# Patient Record
Sex: FEMALE | Race: WHITE | Employment: OTHER | URBAN - NONMETROPOLITAN AREA
[De-identification: names, ages, dates, MRNs, and addresses within clinical notes are randomized per-mention and may not be internally consistent; named-entity substitution may affect disease eponyms.]

---

## 2017-03-09 ENCOUNTER — OFFICE VISIT (OUTPATIENT)
Dept: FAMILY MEDICINE | Facility: OTHER | Age: 67
End: 2017-03-09
Attending: PHYSICIAN ASSISTANT
Payer: MEDICARE

## 2017-03-09 VITALS
SYSTOLIC BLOOD PRESSURE: 124 MMHG | DIASTOLIC BLOOD PRESSURE: 80 MMHG | HEART RATE: 85 BPM | WEIGHT: 175 LBS | BODY MASS INDEX: 27.47 KG/M2 | TEMPERATURE: 98.4 F | RESPIRATION RATE: 16 BRPM | OXYGEN SATURATION: 97 % | HEIGHT: 67 IN

## 2017-03-09 DIAGNOSIS — R39.9 UTI SYMPTOMS: Primary | ICD-10-CM

## 2017-03-09 DIAGNOSIS — L98.9 SKIN LESION: ICD-10-CM

## 2017-03-09 DIAGNOSIS — Z72.0 TOBACCO ABUSE: ICD-10-CM

## 2017-03-09 DIAGNOSIS — Z86.19 HISTORY OF GENITAL WARTS: ICD-10-CM

## 2017-03-09 LAB
ALBUMIN UR-MCNC: NEGATIVE MG/DL
APPEARANCE UR: CLEAR
BACTERIA #/AREA URNS HPF: ABNORMAL /HPF
BILIRUB UR QL STRIP: ABNORMAL
COLOR UR AUTO: YELLOW
GLUCOSE UR STRIP-MCNC: NEGATIVE MG/DL
HGB UR QL STRIP: ABNORMAL
KETONES UR STRIP-MCNC: ABNORMAL MG/DL
LEUKOCYTE ESTERASE UR QL STRIP: ABNORMAL
NITRATE UR QL: NEGATIVE
NON-SQ EPI CELLS #/AREA URNS LPF: ABNORMAL /LPF
PH UR STRIP: 5.5 PH (ref 5–7)
RBC #/AREA URNS AUTO: ABNORMAL /HPF (ref 0–2)
SP GR UR STRIP: 1.02 (ref 1–1.03)
URN SPEC COLLECT METH UR: ABNORMAL
UROBILINOGEN UR STRIP-ACNC: 0.2 EU/DL (ref 0.2–1)
WBC #/AREA URNS AUTO: ABNORMAL /HPF (ref 0–2)

## 2017-03-09 PROCEDURE — 81001 URINALYSIS AUTO W/SCOPE: CPT | Performed by: PHYSICIAN ASSISTANT

## 2017-03-09 PROCEDURE — 99213 OFFICE O/P EST LOW 20 MIN: CPT | Performed by: PHYSICIAN ASSISTANT

## 2017-03-09 PROCEDURE — 99212 OFFICE O/P EST SF 10 MIN: CPT

## 2017-03-09 RX ORDER — SULFAMETHOXAZOLE/TRIMETHOPRIM 800-160 MG
1 TABLET ORAL 2 TIMES DAILY
Qty: 14 TABLET | Refills: 0 | Status: SHIPPED | OUTPATIENT
Start: 2017-03-09 | End: 2017-03-17

## 2017-03-09 ASSESSMENT — PAIN SCALES - GENERAL: PAINLEVEL: NO PAIN (1)

## 2017-03-09 NOTE — PROGRESS NOTES
Chief complaint:   Chief Complaint   Patient presents with     Urinary Problem     urinary urgency, dysuria and pelvic pressure for the past few months off and on, but it has been getting worse       Subjective: Ivonne Kirkland is a 66 year old female with a 3 month history of intermittent dysuria, urinary frequency and urgency. Denies flank pain, nausea, vomiting, fever. Next questions about genital skin lesions I diagnosed as warts in 2015. She has a several year history of lesions that come and go. She uses Aldara which seems to help but leaves a burning sensation on skin.     PMH, PSH, FH reviewed and unchanged    Current Outpatient Prescriptions   Medication Sig Dispense Refill     ASPIRIN PO Take 81 mg by mouth daily.       imiquimod (ALDARA) 5 % cream Apply a small amount to affected areas. Leave on for 8 hours, then wash off. May need to use up to 8 weeks (Patient not taking: Reported on 3/9/2017) 12 packet 3       Allergies   Allergen Reactions     Cefuroxime Other (See Comments) and Nausea     dizziness     Clindamycin Hcl Diarrhea     Codeine Sulfate Other (See Comments)     Passed out     Morphine Sulfate Nausea and Vomiting         ROS:  GI/: as above  Other pertinent systems reviewed and negative    Objective:   B/P: 124/80, T: 98.4, P: 85, R: 16    Gen:Appears well, in no apparent distress.   Resp: Lungs CTA without wheeze, rales, rhonchi  Card: RRR  Abd:Round, soft. Normal bowel sounds. NTTP. No mass or organomegaly. No CVA TTP.        Assessment:   (R39.9) UTI symptoms  (primary encounter diagnosis)  Plan: *UA reflex to Microscopic and Culture, Urine         Microscopic, sulfamethoxazole-trimethoprim         (BACTRIM DS/SEPTRA DS) 800-160 MG per tablet            (Z72.0) Tobacco abuse  Plan: CALL IT QUITS (QUITPLAN) REFERRAL            (L98.9) Skin lesion  Comment: Will refer for opinion by gynecology. I thought lesions were genital warts but possibly another type of skin lesion  Plan: OB/GYN  REFERRAL            (Z86.19) History of genital warts  Comment: as above      Plan: Prescription per Epic - also push fluids, recommend cranberry juice. Call or return to clinic prn if these symptoms worsen or fail to improve as anticipated.        Sue Martínez PA-C

## 2017-03-09 NOTE — MR AVS SNAPSHOT
After Visit Summary   3/9/2017    Ivonne Kirkland    MRN: [id number]           Patient Information     Date Of Birth          1950        Visit Information        Provider Department      3/9/2017 1:45 PM Sue Martínez PA [facility]        Today's Diagnoses     UTI symptoms    -  1    Tobacco abuse        Skin lesion        History of genital warts          Care Instructions    Follow up if symptoms persist or worsen.          Follow-ups after your visit        Additional Services     CALL IT QUITS (QUITPLAN) REFERRAL       [place] [employer] QUITLINES FAX FORM  Fax form to: [phone]    The clinic will facilitate the referral to the quitline.    Provider Information:  ===============================================================  KALEB Davenport  ID#: [id number] - [facility] [phone] Fax: [phone]   [email or web address]  Payor: MEDICARE / Plan: MEDICARE / Product Type: Medicare /   ===============================================================    The Public Health Service Guideline does not recommend providing over-the-counter nicotine replacement therapy products without physician authorization to patients with the following conditions: pregnancy, uncontrolled high blood pressure, or cardiovascular diseases.     I authorize the [place] Tobacco Quitlines to provide over-the-counter nicotine replacement products for the patient listed below if the patient's health plan benefits cover NRT or if the patient is eligible for QUITPLAN services.    Patient Consented to:  ===============================================================  - YES - I am ready to quit tobacco and request the above information be given to the quitline so they may contact me.  I understand that one of [facility] Tobacco Quitlines will inform my provider about my  participation.  ===============================================================  Please check the BEST 3-hour call window for them to reach you: 11am - 2pm  May we leave a message?  YES  Language Preference:  English  Phone Number: Home Phone      [phone]  Mobile          Not on file.     E-mail Address: No e-mail address on record    ========================================================================  FOR QUITLINE USE ONLY:  THIS INFORMATION WILL BE PROVIDED BACK TO THE PROVIDER  Contact date: __/ __/__ or ____ Did not reach after three attempts.    Outcome:  __ Enrolled in telephone counseling program  __ Declined  __ Not Reached    Stage of readiness: _______________________  Planned Quit Date: ___/ ___/ ___  Comments:      2011 [facility]   This message funded by [employer] [facility], an independent licensee of the [employer]. Rev. 11/1/12            OB/GYN REFERRAL       Your provider has referred you to: Tamia Garcia    Please be aware that coverage of these services is subject to the terms and limitations of your health insurance plan.  Call member services at your health plan with any benefit or coverage questions.      Please bring the following to your appointment:  >>   Any x-rays, CTs or MRIs which have been performed.  Contact the facility where they were done to arrange for  prior to your scheduled appointment.  Any new CT, MRI or other procedures ordered by your specialist must be performed at a [place] facility or coordinated by your clinic's referral office.    >>   List of current medications   >>   This referral request   >>   Any documents/labs given to you for this referral                  Who to contact     If you have questions or need follow up information about today's clinic visit or your schedule please contact [facility] directly at [phone].  Normal or non-critical lab and imaging results  "will be communicated to you by MyChart, letter or phone within 4 business days after the clinic has received the results. If you do not hear from us within 7 days, please contact the clinic through Donde or phone. If you have a critical or abnormal lab result, we will notify you by phone as soon as possible.  Submit refill requests through Donde or call your pharmacy and they will forward the refill request to us. Please allow 3 business days for your refill to be completed.          Additional Information About Your Visit        Donde Information     Donde lets you send messages to your doctor, view your test results, renew your prescriptions, schedule appointments and more. To sign up, go to [email or web address] . Click on \"Log in\" on the left side of the screen, which will take you to the Welcome page. Then click on \"Sign up Now\" on the right side of the page.     You will be asked to enter the access code listed below, as well as some personal information. Please follow the directions to create your username and password.     Your access code is: [email or web address]  Expires: 2017  2:50 PM     Your access code will  in 90 days. If you need help or a new code, please call your [facility] clinic or [phone].        Care EveryWhere ID     This is your Care EveryWhere ID. This could be used by other organizations to access your [facility] medical records  [id number]        Your Vitals Were     Pulse Temperature Respirations Height Pulse Oximetry BMI (Body Mass Index)    85 98.4  F (36.9  C) (Tympanic) 16 5' 6.5\" (1.689 m) 97% 27.82 kg/m2       Blood Pressure from Last 3 Encounters:   17 124/80   10/24/15 157/93   08/04/15 164/80    Weight from Last 3 Encounters:   17 175 lb (79.4 kg)   08/04/15 172 lb (78 kg)   14 170 lb (77.1 kg)              We Performed the Following     *UA reflex to Microscopic and Culture     CALL IT QUITS (QUITPLAN) REFERRAL     OB/GYN REFERRAL     Urine " Microscopic          Today's Medication Changes          These changes are accurate as of: 3/9/17  2:50 PM.  If you have any questions, ask your nurse or doctor.               Start taking these medicines.        Dose/Directions    sulfamethoxazole-trimethoprim 800-160 MG per tablet   Commonly known as:  BACTRIM DS/SEPTRA DS   Used for:  UTI symptoms   Started by:  Sue Martínez PA        Dose:  1 tablet   Take 1 tablet by mouth 2 times daily   Quantity:  14 tablet   Refills:  0            Where to get your medicines      These medications were sent to [id number] - [place], [place] - [address]   [id number] [address] [facility] [place] [place] [address]     Phone:  [phone]     sulfamethoxazole-trimethoprim 800-160 MG per tablet                Primary Care Provider Office Phone # Fax #    Emanuel MAYS DO [place] [phone]       [facility] [address]  [facility] [address]        Thank you!     Thank you for choosing [facility]  for your care. Our goal is always to provide you with excellent care. Hearing back from our patients is one way we can continue to improve our services. Please take a few minutes to complete the written survey that you may receive in the mail after your visit with us. Thank you!             Your Updated Medication List - Protect others around you: Learn how to safely use, store and throw away your medicines at www.disposemymeds.org.          This list is accurate as of: 3/9/17  2:50 PM.  Always use your most recent med list.                   Brand Name Dispense Instructions for use    ASPIRIN PO      Take 81 mg by mouth daily.       imiquimod 5 % cream    ALDARA    12 packet    Apply a small amount to affected areas. Leave on for 8 hours, then wash off. May need to use up to 8 weeks       sulfamethoxazole-trimethoprim 800-160 MG per tablet    BACTRIM DS/SEPTRA DS    14 tablet    Take 1 tablet by mouth 2 times daily

## 2017-03-17 ENCOUNTER — OFFICE VISIT (OUTPATIENT)
Dept: OBGYN | Facility: OTHER | Age: 67
End: 2017-03-17
Attending: NURSE PRACTITIONER
Payer: MEDICARE

## 2017-03-17 VITALS
DIASTOLIC BLOOD PRESSURE: 78 MMHG | HEART RATE: 74 BPM | WEIGHT: 175 LBS | OXYGEN SATURATION: 98 % | SYSTOLIC BLOOD PRESSURE: 126 MMHG | HEIGHT: 67 IN | BODY MASS INDEX: 27.47 KG/M2

## 2017-03-17 DIAGNOSIS — L98.9 SKIN LESION: ICD-10-CM

## 2017-03-17 PROCEDURE — 99212 OFFICE O/P EST SF 10 MIN: CPT | Performed by: NURSE PRACTITIONER

## 2017-03-17 PROCEDURE — 99213 OFFICE O/P EST LOW 20 MIN: CPT | Performed by: COUNSELOR

## 2017-03-17 ASSESSMENT — PAIN SCALES - GENERAL: PAINLEVEL: NO PAIN (0)

## 2017-03-17 NOTE — MR AVS SNAPSHOT
"              After Visit Summary   3/17/2017    Ivonne Kirkland    MRN: [id number]           Patient Information     Date Of Birth          1950        Visit Information        Provider Department      3/17/2017 1:00 PM Tamia Garcia NP [facility]        Today's Diagnoses     Skin lesion           Follow-ups after your visit        Who to contact     If you have questions or need follow up information about today's clinic visit or your schedule please [facility] directly at [phone].  Normal or non-critical lab and imaging results will be communicated to you by MyChart, letter or phone within 4 business days after the clinic has received the results. If you do not hear from us within 7 days, please contact the clinic through MyChart or phone. If you have a critical or abnormal lab result, we will notify you by phone as soon as possible.  Submit refill requests through ShopKeep POS or call your pharmacy and they will forward the refill request to us. Please allow 3 business days for your refill to be completed.          Additional Information About Your Visit        MyChart Information     ShopKeep POS lets you send messages to your doctor, view your test results, renew your prescriptions, schedule appointments and more. To sign up, go to [email or web address] . Click on \"Log in\" on the left side of the screen, which will take you to the Welcome page. Then click on \"Sign up Now\" on the right side of the page.     You will be asked to enter the access code listed below, as well as some personal information. Please follow the directions to create your username and password.     Your access code is: [email or web address]  Expires: 2017  3:50 PM     Your access code will  in 90 days. If you need help or a new code, please call your [facility] or [phone].        Care EveryWhere ID     This is your Care EveryWhere ID. This could be used by other organizations to access your " "[place] medical records  [id number]        Your Vitals Were     Pulse Height Pulse Oximetry BMI (Body Mass Index)          74 5' 6.5\" (1.689 m) 98% 27.82 kg/m2         Blood Pressure from Last 3 Encounters:   03/17/17 126/78   03/09/17 124/80   10/24/15 157/93    Weight from Last 3 Encounters:   03/17/17 175 lb (79.4 kg)   03/09/17 175 lb (79.4 kg)   08/04/15 172 lb (78 kg)              Today, you had the following     No orders found for display       Primary Care Provider Office Phone # Fax #    Emanuel Shipley [phone]       [facility] [address] [facility] [address]        Thank you!     Thank you for choosing [facility]  for your care. Our goal is always to provide you with excellent care. Hearing back from our patients is one way we can continue to improve our services. Please take a few minutes to complete the written survey that you may receive in the mail after your visit with us. Thank you!             Your Updated Medication List - Protect others around you: Learn how to safely use, store and throw away your medicines at www.disposemymeds.org.          This list is accurate as of: 3/17/17  1:48 PM.  Always use your most recent med list.                   Brand Name Dispense Instructions for use    ASPIRIN PO      Take 81 mg by mouth daily.       imiquimod 5 % cream    ALDARA    12 packet    Apply a small amount to affected areas. Leave on for 8 hours, then wash off. May need to use up to 8 weeks         "

## 2017-03-17 NOTE — NURSING NOTE
"Chief Complaint   Patient presents with     Consult     Skin Lesion? GW? Mauricio referring       Initial /78  Pulse 74  Ht 5' 6.5\" (1.689 m)  Wt 175 lb (79.4 kg)  SpO2 98%  BMI 27.82 kg/m2 Estimated body mass index is 27.82 kg/(m^2) as calculated from the following:    Height as of this encounter: 5' 6.5\" (1.689 m).    Weight as of this encounter: 175 lb (79.4 kg).  Medication Reconciliation: complete     Tiesha Young      "

## 2017-03-17 NOTE — PROGRESS NOTES
"[facility]                HPI   Ivonne presents at the request of her PCP for evaluation of vulvar lesions.  She states she was diagnosed with genital warts.  She has used aldara cream several times over the years but does not like it due to the burning. She would like to be re-evaluated to make sure there is not something else going on.              Medications:     Current Outpatient Prescriptions Ordered in Epic   Medication     imiquimod (ALDARA) 5 % cream     ASPIRIN PO     No current Epic-ordered facility-administered medications on file.                 Allergies:   Cefuroxime; Clindamycin hcl; Codeine sulfate; and Morphine sulfate         Review of Systems:   The 5 point Review of Systems is negative other than noted in the HPI                     Physical Exam:   Blood pressure 126/78, pulse 74, height 5' 6.5\" (1.689 m), weight 175 lb (79.4 kg), SpO2 98 %, not currently breastfeeding.  Constitutional:   awake, alert, cooperative, no apparent distress, and appears stated age     Genitounirinary:   External Genitalia:  Hair distribution; normal, several epidermal cysts present in labia majora. Non tender and no inflammation.     Vagina:  Discharge absent, Lesions absent, atrophic               Assessment and Plan:   Epidermal cysts of the labia - she may stop use of the aldara cream.  Reassurance provided that this is a benign condition.  occasional irritation is likely die to friction and dryness during activity.  She has been counseled on the use of a personal lubricant during activity to help relieve this.  A silicone based lubricant would be preferable due to occasional urinary leakage.  She is encourage to follow up is she has further concerns.      KELLIE Aragon  3/17/2017  1:37 PM  "

## 2017-05-23 ENCOUNTER — TRANSFERRED RECORDS (OUTPATIENT)
Dept: HEALTH INFORMATION MANAGEMENT | Facility: CLINIC | Age: 67
End: 2017-05-23

## 2017-06-13 ENCOUNTER — OFFICE VISIT (OUTPATIENT)
Dept: FAMILY MEDICINE | Facility: OTHER | Age: 67
End: 2017-06-13
Attending: PHYSICIAN ASSISTANT
Payer: MEDICARE

## 2017-06-13 VITALS
BODY MASS INDEX: 27.94 KG/M2 | TEMPERATURE: 97.7 F | HEART RATE: 79 BPM | WEIGHT: 178 LBS | SYSTOLIC BLOOD PRESSURE: 136 MMHG | OXYGEN SATURATION: 98 % | RESPIRATION RATE: 16 BRPM | HEIGHT: 67 IN | DIASTOLIC BLOOD PRESSURE: 80 MMHG

## 2017-06-13 DIAGNOSIS — J01.00 ACUTE MAXILLARY SINUSITIS, RECURRENCE NOT SPECIFIED: Primary | ICD-10-CM

## 2017-06-13 PROCEDURE — 99212 OFFICE O/P EST SF 10 MIN: CPT

## 2017-06-13 PROCEDURE — 99213 OFFICE O/P EST LOW 20 MIN: CPT | Performed by: PHYSICIAN ASSISTANT

## 2017-06-13 ASSESSMENT — PAIN SCALES - GENERAL: PAINLEVEL: SEVERE PAIN (6)

## 2017-06-13 NOTE — NURSING NOTE
"Chief Complaint   Patient presents with     Sinus Problem     facial pain and pressure with sinus drainage that started last Wednesday       Initial /80 (BP Location: Left arm, Patient Position: Chair, Cuff Size: Adult Large)  Pulse 79  Temp 97.7  F (36.5  C) (Tympanic)  Resp 16  Ht 5' 6.5\" (1.689 m)  Wt 178 lb (80.7 kg)  SpO2 98%  BMI 28.3 kg/m2 Estimated body mass index is 28.3 kg/(m^2) as calculated from the following:    Height as of this encounter: 5' 6.5\" (1.689 m).    Weight as of this encounter: 178 lb (80.7 kg).  Medication Reconciliation: complete   Urmila Robles LPN      "

## 2017-06-13 NOTE — PROGRESS NOTES
Chief complaint:   Chief Complaint   Patient presents with     Sinus Problem     facial pain and pressure with sinus drainage that started last Wednesday       Subjective: Ivonne Kirkland is a 67 year old female with a 1 week history of nasal congestion, PND, sore throat, cough, fever. Denies nausea, vomiting, diarrhea    History reviewed. No pertinent past medical history.  Past Surgical History:   Procedure Laterality Date     CHOLECYSTECTOMY       GYN SURGERY      hysterectomy     ORTHOPEDIC SURGERY      right wrist fracture     ORTHOPEDIC SURGERY      left elbow cyst     SOFT TISSUE SURGERY      I and D cysts on back and chest     Current Outpatient Prescriptions   Medication Sig Dispense Refill     ASPIRIN PO Take 81 mg by mouth daily.        Allergies   Allergen Reactions     Cefuroxime Other (See Comments) and Nausea     dizziness     Clindamycin Hcl Diarrhea     Codeine Sulfate Other (See Comments)     Passed out     Morphine Sulfate Nausea and Vomiting       Family and Social History are reviewed.    Review Of Systems  Skin: Denies rash  Eyes: Denies redness or discharge   Ears/Nose/Throat: as above  Respiratory: as above  Cardiovascular: Denies chest pain or palpitations   Gastrointestinal:Denies nausea, vomiting, diarrhea   Musculoskeletal: No myalgias    Objective:   B/P: 136/80, T: 97.7, P: 79, R: 16    Physical Exam  General: Alert orientated. NAD  Skin is unremarkable.  HEENT:Posterior pharynx erythematous. EAC's clear. TM's intact. Nasal mucosa edematous, erythematous. TTP maxillary sinuses. Neck supple. No lymphadenopathy  Lungs: Clear to auscultation  Cardiac: RRR    Assessment:   (J01.00) Acute maxillary sinusitis, recurrence not specified  (primary encounter diagnosis)  Plan: amoxicillin-clavulanate (AUGMENTIN) 875-125 MG         per tablet              Rest. Increase fluids. Tylenol for fever or discomfort. Return if symptoms persist or worsen.    Sue Martínez PA-C

## 2017-06-13 NOTE — MR AVS SNAPSHOT
"              After Visit Summary   2017    Ivonne Kirkland    MRN: [id number]           Patient Information     Date Of Birth          1950        Visit Information        Provider Department      2017 2:15 PM Sue Martínez PA [facility]        Today's Diagnoses     Acute maxillary sinusitis, recurrence not specified    -  1       Follow-ups after your visit        Who to contact     If you have questions or need follow up information about today's clinic visit or your schedule please contact [facility] directly at [phone].  Normal or non-critical lab and imaging results will be communicated to you by FreshRealmhart, letter or phone within 4 business days after the clinic has received the results. If you do not hear from us within 7 days, please contact the clinic through FreshRealmhart or phone. If you have a critical or abnormal lab result, we will notify you by phone as soon as possible.  Submit refill requests through Ocapi or call your pharmacy and they will forward the refill request to us. Please allow 3 business days for your refill to be completed.          Additional Information About Your Visit        MyChart Information     Ocapi lets you send messages to your doctor, view your test results, renew your prescriptions, schedule appointments and more. To sign up, go to [email or web address] . Click on \"Log in\" on the left side of the screen, which will take you to the Welcome page. Then click on \"Sign up Now\" on the right side of the page.     You will be asked to enter the access code listed below, as well as some personal information. Please follow the directions to create your username and password.     Your access code is: [email or web address]  Expires: 2017  2:43 PM     Your access code will  in 90 days. If you need help or a new code, please call your [facility] or [phone].        Care EveryWhere ID     This is your Care EveryWhere ID. This " "could be used by other organizations to access your [place] medical records  [id number]        Your Vitals Were     Pulse Temperature Respirations Height Pulse Oximetry BMI (Body Mass Index)    79 97.7  F (36.5  C) (Tympanic) 16 5' 6.5\" (1.689 m) 98% 28.3 kg/m2       Blood Pressure from Last 3 Encounters:   06/13/17 136/80   03/17/17 126/78   03/09/17 124/80    Weight from Last 3 Encounters:   06/13/17 178 lb (80.7 kg)   03/17/17 175 lb (79.4 kg)   03/09/17 175 lb (79.4 kg)              Today, you had the following     No orders found for display         Today's Medication Changes          These changes are accurate as of: 6/13/17  2:43 PM.  If you have any questions, ask your nurse or doctor.               Start taking these medicines.        Dose/Directions    amoxicillin-clavulanate 875-125 MG per tablet   Commonly known as:  AUGMENTIN   Used for:  Acute maxillary sinusitis, recurrence not specified   Started by:  Sue Martínez PA        Dose:  1 tablet   Take 1 tablet by mouth 2 times daily   Quantity:  28 tablet   Refills:  0            Where to get your medicines      These medications were sent to [id number] - [place] - [address] [facility] [address] [facility] [address], [place] [address]     Phone:  [phone]     amoxicillin-clavulanate 875-125 MG per tablet                Primary Care Provider Office Phone # Fax #    Emanuel Shipley [phone]       [facility] [address] [facility] [address]        Thank you!     Thank you for choosing [facility]  for your care. Our goal is always to provide you with excellent care. Hearing back from our patients is one way we can continue to improve our services. Please take a few minutes to complete the written survey that you may receive in the mail after your visit with us. Thank you!             Your Updated Medication List - Protect others around you: Learn how to safely use, store and throw away your " medicines at www.disposemymeds.org.          This list is accurate as of: 6/13/17  2:43 PM.  Always use your most recent med list.                   Brand Name Dispense Instructions for use    amoxicillin-clavulanate 875-125 MG per tablet    AUGMENTIN    28 tablet    Take 1 tablet by mouth 2 times daily       ASPIRIN PO      Take 81 mg by mouth daily.

## 2017-12-23 ENCOUNTER — APPOINTMENT (OUTPATIENT)
Dept: GENERAL RADIOLOGY | Facility: HOSPITAL | Age: 67
End: 2017-12-23
Attending: PHYSICIAN ASSISTANT
Payer: MEDICARE

## 2017-12-23 ENCOUNTER — HOSPITAL ENCOUNTER (EMERGENCY)
Facility: HOSPITAL | Age: 67
Discharge: HOME | End: 2017-12-23
Attending: PHYSICIAN ASSISTANT | Admitting: PHYSICIAN ASSISTANT
Payer: MEDICARE

## 2017-12-23 VITALS
WEIGHT: 177 LBS | TEMPERATURE: 98.1 F | RESPIRATION RATE: 18 BRPM | DIASTOLIC BLOOD PRESSURE: 86 MMHG | OXYGEN SATURATION: 98 % | BODY MASS INDEX: 28.14 KG/M2 | SYSTOLIC BLOOD PRESSURE: 169 MMHG | HEART RATE: 92 BPM

## 2017-12-23 DIAGNOSIS — S80.12XA CONTUSION OF LEFT LOWER EXTREMITY, INITIAL ENCOUNTER: ICD-10-CM

## 2017-12-23 PROCEDURE — 99213 OFFICE O/P EST LOW 20 MIN: CPT

## 2017-12-23 PROCEDURE — 73590 X-RAY EXAM OF LOWER LEG: CPT | Mod: TC,LT

## 2017-12-23 PROCEDURE — 99213 OFFICE O/P EST LOW 20 MIN: CPT | Performed by: PHYSICIAN ASSISTANT

## 2017-12-23 ASSESSMENT — ENCOUNTER SYMPTOMS
PSYCHIATRIC NEGATIVE: 1
CARDIOVASCULAR NEGATIVE: 1
JOINT SWELLING: 1
NEUROLOGICAL NEGATIVE: 1
ARTHRALGIAS: 1
CONSTITUTIONAL NEGATIVE: 1

## 2017-12-23 NOTE — ED AVS SNAPSHOT
[facility]    [address] [address]    Phone:  [phone]                                       Ivonne Kirkland   MRN: [id number]    Department:  [facility]   Date of Visit:  12/23/2017           Patient Information     Date Of Birth          1950        Your diagnoses for this visit were:     Contusion of left lower extremity, initial encounter        You were seen by Luisana Leach PA.      Follow-up Information     Follow up with Emanuel Shipley DO.    Specialty:  Family Practice    Why:  If symptoms worsen    Contact information:    [facility]  [address] [facility] [address] [phone]          Follow up with [facility].    Specialty:  EMERGENCY MEDICINE    Why:  If further concerns develop    Contact information:    [address] [address] [phone]    Additional information:    From [facility]: Take [address]. Turn left at [address]. Turn left at the first stoplight on [address]. At the first stop sign, take a right onto [address]. Take a left into the parking lot and continue through until you reach the North enterance of the building.       From [place]: Take [address]. Take the [address] ramp towards [address]. Turn left onto [address]. Take a slight right onto [address]. Turn left at the first stoplight on [address]. At the first stop sign, take a right onto [address]. Take a left into the parking lot and continue through until you reach the North enterance of the building.       From [place]: Take [address]. Take a right at [address]. At the first stop sign, take a right onto [address]. Take a left into the parking lot and continue through until you reach the North enterance of the building.       Discharge References/Attachments     BONE BRUISE (BONE CONTUSION), UNDERSTANDING  "(ENGLISH)         Review of your medicines      Our records show that you are taking the medicines listed below. If these are incorrect, please call your family doctor or clinic.        Dose / Directions Last dose taken    ASPIRIN PO   Dose:  81 mg        Take 81 mg by mouth daily.   Refills:  0                Procedures and tests performed during your visit     Tib/Fib XR, left      Orders Needing Specimen Collection     None      Pending Results     Date and Time Order Name Status Description    2017 1333 Tib/Fib XR, left In process             Pending Culture Results     No orders found from 2017 to 2017.            Thank you for choosing [facility]       Thank you for choosing [facility] for your care. Our goal is always to provide you with excellent care. Hearing back from our patients is one way we can continue to improve our services. Please take a few minutes to complete the written survey that you may receive in the mail after you visit with us. Thank you!        [employer] Information     [employer] lets you send messages to your doctor, view your test results, renew your prescriptions, schedule appointments and more. To sign up, go to [email or web address] . Click on \"Log in\" on the left side of the screen, which will take you to the Welcome page. Then click on \"Sign up Now\" on the right side of the page.     You will be asked to enter the access code listed below, as well as some personal information. Please follow the directions to create your username and password.     Your access code is: [email or web address]  Expires: 3/23/2018  1:51 PM     Your access code will  in 90 days. If you need help or a new code, please call your [facility] clinic or [phone].        Care EveryWhere ID     This is your Care EveryWhere ID. This could be used by other organizations to access your [facility] medical records  [id number]        Equal Access to Services     SAURAV ANTHONY: Susanna Durbin, " meera dorsey, mckinley metzger. So [facility] [phone].    ATENCIÓN: Si habla español, tiene a may disposición servicios gratuitos de asistencia lingüística. Llame al [phone].    We comply with applicable federal civil rights laws and [place] laws. We do not discriminate on the basis of race, color, national origin, age, disability, sex, sexual orientation, or gender identity.            After Visit Summary       This is your record. Keep this with you and show to your community pharmacist(s) and doctor(s) at your next visit.

## 2017-12-23 NOTE — ED NOTES
Ambulates to 5 with c/o left leg and ankle pain resulting from an injury on a step stool last Sunday, pt is concerned as now the leg is very bruised

## 2017-12-23 NOTE — ED AVS SNAPSHOT
[facility]    [address] [place] [address]    Phone:  [phone]                                       Ivonne Kirkland   MRN: [id number]    Department:  [facility]   Date of Visit:  12/23/2017           After Visit Summary Signature Page     I have received my discharge instructions, and my questions have been answered. I have discussed any challenges I see with this plan with the nurse or doctor.    ..........................................................................................................................................  Patient/Patient Representative Signature      ..........................................................................................................................................  Patient Representative Print Name and Relationship to Patient    ..................................................               ................................................  Date                                            Time    ..........................................................................................................................................  Reviewed by Signature/Title    ...................................................              ..............................................  Date                                                            Time

## 2017-12-23 NOTE — ED PROVIDER NOTES
"  History     Chief Complaint   Patient presents with     Leg Injury     \"left leg and left ankle injury from a step stool, occurred last Sunday\"      The history is provided by the patient. No  was used.     Ivonne Kirkland is a 67 year old female who 6 days ago fell off of a stool, hitting her left mid shin area. Still has swelling and bruising. Denies any head injury, no neck pain. She is worried because she has developed a black area on her left foot on the medial side. Pt worried she is developing gangrene and doesn't want to have to have her foot amputated. No fever.       Problem List:    There are no active problems to display for this patient.       Past Medical History:    History reviewed. No pertinent past medical history.    Past Surgical History:    Past Surgical History:   Procedure Laterality Date     CHOLECYSTECTOMY       GYN SURGERY      hysterectomy     ORTHOPEDIC SURGERY      right wrist fracture     ORTHOPEDIC SURGERY      left elbow cyst     SOFT TISSUE SURGERY      I and D cysts on back and chest       Family History:    Family History   Problem Relation Age of Onset     DIABETES Mother      DIABETES Maternal Grandfather      Hypertension No family hx of      Hyperlipidemia No family hx of      Asthma No family hx of      Thyroid Disease No family hx of        Social History:  Marital Status:   [2]  Social History   Substance Use Topics     Smoking status: Current Every Day Smoker     Packs/day: 0.50     Smokeless tobacco: Never Used      Comment: referral entered for Quit Plan (3/9/17) - pt has been trying to quit     Alcohol use 0.0 oz/week     0 Standard drinks or equivalent per week      Comment: occassional        Medications:      ASPIRIN PO         Review of Systems   Constitutional: Negative.    HENT: Negative.    Cardiovascular: Negative.    Musculoskeletal: Positive for arthralgias and joint swelling.   Neurological: Negative.    Psychiatric/Behavioral: " Negative.        Physical Exam   BP: 169/86  Pulse: 92  Temp: 98.1  F (36.7  C)  Resp: 18  Weight: 80.3 kg (177 lb)  SpO2: 98 %      Physical Exam   Constitutional: She is oriented to person, place, and time. She appears well-developed and well-nourished. No distress.   Cardiovascular: Normal rate.    Pulmonary/Chest: Effort normal.   Musculoskeletal:   Left lower leg: healing wound at mid shaft. No erythema. Scab intact. mid shaft down to the ankle, mild edema. Moderate TTP to the anterior tibia area. Ecchymosis has settled down into the ankle area, more so on the medial side. M/n/v intact, +AFROM, 5/5 strength   Neurological: She is alert and oriented to person, place, and time.   Skin: She is not diaphoretic.   Psychiatric: She has a normal mood and affect.   Nursing note and vitals reviewed.      ED Course     ED Course     Procedures          Left tib/fib xray: no acute process noted,  Pending official rad results    Assessments & Plan (with Medical Decision Making)     I have reviewed the nursing notes.    I have reviewed the findings, diagnosis, plan and need for follow up with the patient.      Final diagnoses:   Contusion of left lower extremity, initial encounter         Patient verbally educated and given appropriate education sheets for the diagnoses and has no questions.   Follow up with your Primary Care provider if symptoms increase or if concerns develop, return to the ER  Luisana Leach Certified  Physician Assistant  12/23/2017  2:25 PM  URGENT CARE CLINIC      12/23/2017   [facility]     Luisana Leach PA  12/23/17 9815

## 2017-12-26 NOTE — PROGRESS NOTES
Left Tib/Fib XR - IMPRESSION: Soft tissue swelling about the lower calf and shin. The left tibia and fibula are otherwise normal.Advised to follow up with your Primary Care provider if symptoms increase or if concerns develop, return to the ER.  Report routed to PCP, Dr. ELI Shipley.

## 2017-12-31 ENCOUNTER — APPOINTMENT (OUTPATIENT)
Dept: GENERAL RADIOLOGY | Facility: HOSPITAL | Age: 67
End: 2017-12-31
Attending: PHYSICIAN ASSISTANT
Payer: MEDICARE

## 2017-12-31 ENCOUNTER — HOSPITAL ENCOUNTER (EMERGENCY)
Facility: HOSPITAL | Age: 67
Discharge: HOME | End: 2017-12-31
Attending: PHYSICIAN ASSISTANT | Admitting: PHYSICIAN ASSISTANT
Payer: MEDICARE

## 2017-12-31 VITALS
DIASTOLIC BLOOD PRESSURE: 87 MMHG | SYSTOLIC BLOOD PRESSURE: 170 MMHG | OXYGEN SATURATION: 97 % | HEIGHT: 66 IN | TEMPERATURE: 97.3 F | RESPIRATION RATE: 16 BRPM

## 2017-12-31 DIAGNOSIS — S80.12XA CONTUSION OF LEFT LOWER LEG, INITIAL ENCOUNTER: ICD-10-CM

## 2017-12-31 DIAGNOSIS — S89.92XA LEG INJURY, LEFT, INITIAL ENCOUNTER: ICD-10-CM

## 2017-12-31 LAB
ANION GAP SERPL CALCULATED.3IONS-SCNC: 8 MMOL/L (ref 3–14)
BASOPHILS # BLD AUTO: 0.1 10E9/L (ref 0–0.2)
BASOPHILS NFR BLD AUTO: 0.8 %
BUN SERPL-MCNC: 16 MG/DL (ref 7–30)
CALCIUM SERPL-MCNC: 8.4 MG/DL (ref 8.5–10.1)
CHLORIDE SERPL-SCNC: 99 MMOL/L (ref 94–109)
CO2 SERPL-SCNC: 26 MMOL/L (ref 20–32)
CREAT SERPL-MCNC: 0.73 MG/DL (ref 0.52–1.04)
DIFFERENTIAL METHOD BLD: NORMAL
EOSINOPHIL # BLD AUTO: 0.3 10E9/L (ref 0–0.7)
EOSINOPHIL NFR BLD AUTO: 3.3 %
ERYTHROCYTE [DISTWIDTH] IN BLOOD BY AUTOMATED COUNT: 12.5 % (ref 10–15)
GFR SERPL CREATININE-BSD FRML MDRD: 79 ML/MIN/1.7M2
GLUCOSE SERPL-MCNC: 110 MG/DL (ref 70–99)
HCT VFR BLD AUTO: 39.4 % (ref 35–47)
HGB BLD-MCNC: 13.7 G/DL (ref 11.7–15.7)
IMM GRANULOCYTES # BLD: 0 10E9/L (ref 0–0.4)
IMM GRANULOCYTES NFR BLD: 0.1 %
LYMPHOCYTES # BLD AUTO: 1.2 10E9/L (ref 0.8–5.3)
LYMPHOCYTES NFR BLD AUTO: 15.6 %
MCH RBC QN AUTO: 31.7 PG (ref 26.5–33)
MCHC RBC AUTO-ENTMCNC: 34.8 G/DL (ref 31.5–36.5)
MCV RBC AUTO: 91 FL (ref 78–100)
MONOCYTES # BLD AUTO: 0.6 10E9/L (ref 0–1.3)
MONOCYTES NFR BLD AUTO: 8 %
NEUTROPHILS # BLD AUTO: 5.5 10E9/L (ref 1.6–8.3)
NEUTROPHILS NFR BLD AUTO: 72.2 %
NRBC # BLD AUTO: 0 10*3/UL
NRBC BLD AUTO-RTO: 0 /100
PLATELET # BLD AUTO: 218 10E9/L (ref 150–450)
POTASSIUM SERPL-SCNC: 4.3 MMOL/L (ref 3.4–5.3)
RBC # BLD AUTO: 4.32 10E12/L (ref 3.8–5.2)
SODIUM SERPL-SCNC: 133 MMOL/L (ref 133–144)
WBC # BLD AUTO: 7.6 10E9/L (ref 4–11)

## 2017-12-31 PROCEDURE — 96372 THER/PROPH/DIAG INJ SC/IM: CPT

## 2017-12-31 PROCEDURE — 85025 COMPLETE CBC W/AUTO DIFF WBC: CPT | Performed by: PHYSICIAN ASSISTANT

## 2017-12-31 PROCEDURE — 99214 OFFICE O/P EST MOD 30 MIN: CPT | Mod: 25

## 2017-12-31 PROCEDURE — 36415 COLL VENOUS BLD VENIPUNCTURE: CPT | Performed by: PHYSICIAN ASSISTANT

## 2017-12-31 PROCEDURE — 99214 OFFICE O/P EST MOD 30 MIN: CPT | Performed by: PHYSICIAN ASSISTANT

## 2017-12-31 PROCEDURE — 25000128 H RX IP 250 OP 636: Performed by: PHYSICIAN ASSISTANT

## 2017-12-31 PROCEDURE — 73610 X-RAY EXAM OF ANKLE: CPT | Mod: TC,LT

## 2017-12-31 PROCEDURE — 80048 BASIC METABOLIC PNL TOTAL CA: CPT | Performed by: PHYSICIAN ASSISTANT

## 2017-12-31 RX ORDER — KETOROLAC TROMETHAMINE 30 MG/ML
30 INJECTION, SOLUTION INTRAMUSCULAR; INTRAVENOUS ONCE
Status: COMPLETED | OUTPATIENT
Start: 2017-12-31 | End: 2017-12-31

## 2017-12-31 RX ADMIN — KETOROLAC TROMETHAMINE 30 MG: 30 INJECTION, SOLUTION INTRAMUSCULAR at 12:53

## 2017-12-31 NOTE — ED AVS SNAPSHOT
[facility]    [address] [place] [address]    Phone:  [phone]                                       Ivonne Kirkland   MRN: [id number]    Department[facility]  [facility]   Date of Visit:  12/31/2017           After Visit Summary Signature Page     I have received my discharge instructions, and my questions have been answered. I have discussed any challenges I see with this plan with the nurse or doctor.    ..........................................................................................................................................  Patient/Patient Representative Signature      ..........................................................................................................................................  Patient Representative Print Name and Relationship to Patient    ..................................................               ................................................  Date                                            Time    ..........................................................................................................................................  Reviewed by Signature/Title    ...................................................              ..............................................  Date                                                            Time

## 2017-12-31 NOTE — ED NOTES
Patient presents with swelling and pressure in Lt Leg.  Patient was seen on Dec 1, patient states is not getting any better and is getting worse and wants to make sure she doesn't have blood clot.

## 2017-12-31 NOTE — DISCHARGE INSTRUCTIONS
- Labs are normal - no concern for infection. Repeat XR negative for fracture.   - In your case, bruising is getting worse as gravity is pulling down on all of the black and blue drainage.   - Continue to rest as needed, ice, compression, elevation  - Rotate ibuprofen (600mg) and tylenol (500-650mg) every 4-6 hrs for 2-3 days  - FYITopicals: Arnica Cream (5$ at [employer]) has been used to help bruising heal sooner.

## 2017-12-31 NOTE — ED PROVIDER NOTES
History     Chief Complaint   Patient presents with     Leg Swelling     Pt states she is worried because left leg swelling hasn't gone down and feels more painful.     The history is provided by the patient. No  was used.     Ivonne Kirkland is a 67 year old female who bruised the left lower leg. She was seen on 12/23/17 and XR taken after injury and was negative for fracture. She has been elevating the ankle sometimes, but does continue with daily activities. She is concerned about the black and blue area at the ankle and the fact that swelling has not gone down yet. NO calf pain or swelling. The ankle, however, is sore now.     Problem List:    There are no active problems to display for this patient.       Past Medical History:    No past medical history on file.    Past Surgical History:    Past Surgical History:   Procedure Laterality Date     CHOLECYSTECTOMY       GYN SURGERY      hysterectomy     ORTHOPEDIC SURGERY      right wrist fracture     ORTHOPEDIC SURGERY      left elbow cyst     SOFT TISSUE SURGERY      I and D cysts on back and chest       Family History:    Family History   Problem Relation Age of Onset     DIABETES Mother      DIABETES Maternal Grandfather      Hypertension No family hx of      Hyperlipidemia No family hx of      Asthma No family hx of      Thyroid Disease No family hx of        Social History:  Marital Status:   [2]  Social History   Substance Use Topics     Smoking status: Current Every Day Smoker     Packs/day: 0.50     Smokeless tobacco: Never Used      Comment: referral entered for Quit Plan (3/9/17) - pt has been trying to quit     Alcohol use 0.0 oz/week     0 Standard drinks or equivalent per week      Comment: occassional        Medications:      ASPIRIN PO         Review of Systems   Constitutional: Negative.    Respiratory: Negative.    Cardiovascular: Negative.    Musculoskeletal: Positive for arthralgias and joint swelling.  "  Neurological: Negative.    Psychiatric/Behavioral: Negative.        Physical Exam   BP: 170/87  Heart Rate: 89  Temp: 97.3  F (36.3  C)  Resp: 16  Height: 167.6 cm (5' 6\")  SpO2: 97 %      Physical Exam   Constitutional: She is oriented to person, place, and time. She appears well-developed and well-nourished. No distress.   Cardiovascular: Normal rate.    Pulmonary/Chest: Effort normal.   Musculoskeletal:        Left ankle: She exhibits swelling and ecchymosis. She exhibits normal range of motion. Tenderness. Medial malleolus tenderness found. Achilles tendon normal.        Left lower leg: She exhibits tenderness. She exhibits no swelling.        Legs:  Neurological: She is alert and oriented to person, place, and time.   Skin: Skin is warm and dry.   Psychiatric: She has a normal mood and affect.   Nursing note and vitals reviewed.      ED Course     ED Course     Procedures           Labs Ordered and Resulted from Time of ED Arrival Up to the Time of Departure from the ED   BASIC METABOLIC PANEL - Abnormal; Notable for the following:        Result Value    Glucose 110 (*)     Calcium 8.4 (*)     All other components within normal limits   CBC WITH PLATELETS DIFFERENTIAL       Assessments & Plan (with Medical Decision Making)     I have reviewed the nursing notes.  I have reviewed the findings, diagnosis, plan and need for follow up with the patient.    Discharge Medication List as of 12/31/2017  1:10 PM          Final diagnoses:   Leg injury, left, initial encounter   Contusion of left lower leg, initial encounter   XR ankle obtained and negative for Fx.  Ankle is ace wrapped. Reassured patient that swelling is dependent and should elevate above the trevino. Continue with compression and may ambulate as tolerated. Follow up with Primary Care Provider in 5-7 days with poor improvement. Seek attention sooner with worsening despite treatment. Patient verbally educated and given appropriate education sheets for each " of the diagnoses and has no questions.    Tima Zamorano PA-C   1/2/2018   2:43 PM    12/31/2017   [facility]     Tima Zamorano PA  01/02/18 1449

## 2017-12-31 NOTE — ED AVS SNAPSHOT
[facility]    [address] [address]    Phone:  [phone]                                       Ivonne Kirkland   MRN: [id number]    Department:  [facility]   Date of Visit:  12/31/2017           Patient Information     Date Of Birth          1950        Your diagnoses for this visit were:     Leg injury, left, initial encounter     Contusion of left lower leg, initial encounter        You were seen by Tima Zamorano PA.      Follow-up Information     Follow up with Emanuel Shipley DO In 5 days.    Specialty:  Family Practice    Contact information:    [facility]  [address] [facility] [address] [phone]          Follow up with [facility].    Specialty:  EMERGENCY MEDICINE    Why:  If symptoms worsen    Contact information:    [address] [address] [phone]    Additional information:    From [place] Area: Take [address]. Turn left at [address]. Turn left at the first stoplight on [address]. At the first stop sign, take a right onto [address]. Take a left into the parking lot and continue through until you reach the North enterance of the building.       From [place]: Take [address]. Take the [address] ramp towards [place]. Turn left onto [address]. Take a slight right onto [address]. Turn left at the first stoplight on [address]. At the first stop sign, take a right onto [address]. Take a left into the parking lot and continue through until you reach the North enterance of the building.       From [place]: Take [address]. Take a right at [address]. At the first stop sign, take a right onto [address]. Take a left into the parking lot and continue through until you reach the North enterance of the building.         Discharge Instructions       - Labs are normal - no concern for infection.  "Repeat XR negative for fracture.   - In your case, bruising is getting worse as gravity is pulling down on all of the black and blue drainage.   - Continue to rest as needed, ice, compression, elevation  - Rotate ibuprofen (600mg) and tylenol (500-650mg) every 4-6 hrs for 2-3 days  - FYITopicals: Arnica Cream (5$ at [employer]) has been used to help bruising heal sooner.       Discharge References/Attachments     BRUISES (CONTUSIONS) (ENGLISH)         Review of your medicines      Our records show that you are taking the medicines listed below. If these are incorrect, please call your family doctor or clinic.        Dose / Directions Last dose taken    ASPIRIN PO   Dose:  81 mg        Take 81 mg by mouth daily.   Refills:  0                Procedures and tests performed during your visit     Ankle XR, G/E 3 views, left    Basic metabolic panel    CBC with platelets differential      Orders Needing Specimen Collection     None      Pending Results     No orders found from 12/29/2017 to 1/1/2018.            Pending Culture Results     No orders found from 12/29/2017 to 1/1/2018.            Thank you for choosing [facility]       Thank you for choosing [facility] for your care. Our goal is always to provide you with excellent care. Hearing back from our patients is one way we can continue to improve our services. Please take a few minutes to complete the written survey that you may receive in the mail after you visit with us. Thank you!        [employer] Information     [employer] lets you send messages to your doctor, view your test results, renew your prescriptions, schedule appointments and more. To sign up, go to [email or web address] . Click on \"Log in\" on the left side of the screen, which will take you to the Welcome page. Then click on \"Sign up Now\" on the right side of the page.     You will be asked to enter the access code listed below, as well as some personal information. Please follow the directions to create " your username and password.     Your access code is: [email or web address]  Expires: 3/23/2018  1:51 PM     Your access code will  in 90 days. If you need help or a new code, please call your [place] clinic or [phone].        Care EveryWhere ID     This is your Care EveryWhere ID. This could be used by other organizations to access your [place] medical records  [id number]        Equal Access to Services     [facility] CenterJANY : Hadii araceli robbins hadasho Soomaali, waaxda luqadaha, qaybta kaalmada adeegyada, mckinley renee . So [facility] [phone].    ATENCIÓN: Si habla español, tiene a may disposición servicios gratuitos de asistencia lingüística. Llame al [phone].    We comply with applicable federal civil rights laws and [place] laws. We do not discriminate on the basis of race, color, national origin, age, disability, sex, sexual orientation, or gender identity.            After Visit Summary       This is your record. Keep this with you and show to your community pharmacist(s) and doctor(s) at your next visit.

## 2018-01-02 ASSESSMENT — ENCOUNTER SYMPTOMS
PSYCHIATRIC NEGATIVE: 1
JOINT SWELLING: 1
CARDIOVASCULAR NEGATIVE: 1
CONSTITUTIONAL NEGATIVE: 1
NEUROLOGICAL NEGATIVE: 1
RESPIRATORY NEGATIVE: 1
ARTHRALGIAS: 1

## 2019-03-05 ENCOUNTER — APPOINTMENT (OUTPATIENT)
Dept: GENERAL RADIOLOGY | Facility: HOSPITAL | Age: 69
End: 2019-03-05
Attending: FAMILY MEDICINE
Payer: MEDICARE

## 2019-03-05 ENCOUNTER — HOSPITAL ENCOUNTER (EMERGENCY)
Facility: HOSPITAL | Age: 69
Discharge: HOME | End: 2019-03-05
Attending: FAMILY MEDICINE | Admitting: FAMILY MEDICINE
Payer: MEDICARE

## 2019-03-05 VITALS
DIASTOLIC BLOOD PRESSURE: 75 MMHG | HEART RATE: 86 BPM | TEMPERATURE: 97.5 F | OXYGEN SATURATION: 97 % | SYSTOLIC BLOOD PRESSURE: 139 MMHG | RESPIRATION RATE: 18 BRPM

## 2019-03-05 DIAGNOSIS — R00.2 PALPITATIONS: ICD-10-CM

## 2019-03-05 LAB
ALBUMIN SERPL-MCNC: 3.7 G/DL (ref 3.4–5)
ALP SERPL-CCNC: 100 U/L (ref 40–150)
ALT SERPL W P-5'-P-CCNC: 19 U/L (ref 0–50)
ANION GAP SERPL CALCULATED.3IONS-SCNC: 9 MMOL/L (ref 3–14)
AST SERPL W P-5'-P-CCNC: 14 U/L (ref 0–45)
BASOPHILS # BLD AUTO: 0 10E9/L (ref 0–0.2)
BASOPHILS NFR BLD AUTO: 0.5 %
BILIRUB SERPL-MCNC: 0.3 MG/DL (ref 0.2–1.3)
BUN SERPL-MCNC: 23 MG/DL (ref 7–30)
CALCIUM SERPL-MCNC: 8.3 MG/DL (ref 8.5–10.1)
CHLORIDE SERPL-SCNC: 102 MMOL/L (ref 94–109)
CO2 SERPL-SCNC: 25 MMOL/L (ref 20–32)
CREAT SERPL-MCNC: 0.74 MG/DL (ref 0.52–1.04)
D DIMER PPP DDU-MCNC: <200 NG/ML D-DU (ref 0–300)
DIFFERENTIAL METHOD BLD: NORMAL
EOSINOPHIL # BLD AUTO: 0.2 10E9/L (ref 0–0.7)
EOSINOPHIL NFR BLD AUTO: 3.8 %
ERYTHROCYTE [DISTWIDTH] IN BLOOD BY AUTOMATED COUNT: 12.5 % (ref 10–15)
GFR SERPL CREATININE-BSD FRML MDRD: 82 ML/MIN/{1.73_M2}
GLUCOSE SERPL-MCNC: 99 MG/DL (ref 70–99)
HCT VFR BLD AUTO: 37.4 % (ref 35–47)
HGB BLD-MCNC: 13 G/DL (ref 11.7–15.7)
IMM GRANULOCYTES # BLD: 0 10E9/L (ref 0–0.4)
IMM GRANULOCYTES NFR BLD: 0.2 %
LYMPHOCYTES # BLD AUTO: 1 10E9/L (ref 0.8–5.3)
LYMPHOCYTES NFR BLD AUTO: 17.9 %
MCH RBC QN AUTO: 31.3 PG (ref 26.5–33)
MCHC RBC AUTO-ENTMCNC: 34.8 G/DL (ref 31.5–36.5)
MCV RBC AUTO: 90 FL (ref 78–100)
MONOCYTES # BLD AUTO: 0.6 10E9/L (ref 0–1.3)
MONOCYTES NFR BLD AUTO: 10.6 %
NEUTROPHILS # BLD AUTO: 3.7 10E9/L (ref 1.6–8.3)
NEUTROPHILS NFR BLD AUTO: 67 %
NRBC # BLD AUTO: 0 10*3/UL
NRBC BLD AUTO-RTO: 0 /100
PLATELET # BLD AUTO: 213 10E9/L (ref 150–450)
POTASSIUM SERPL-SCNC: 4.1 MMOL/L (ref 3.4–5.3)
PROT SERPL-MCNC: 6.6 G/DL (ref 6.8–8.8)
RBC # BLD AUTO: 4.15 10E12/L (ref 3.8–5.2)
SODIUM SERPL-SCNC: 136 MMOL/L (ref 133–144)
TROPONIN I SERPL-MCNC: <0.015 UG/L (ref 0–0.04)
TROPONIN I SERPL-MCNC: <0.015 UG/L (ref 0–0.04)
TSH SERPL DL<=0.005 MIU/L-ACNC: 1.35 MU/L (ref 0.4–4)
WBC # BLD AUTO: 5.5 10E9/L (ref 4–11)

## 2019-03-05 PROCEDURE — 99285 EMERGENCY DEPT VISIT HI MDM: CPT | Mod: 25

## 2019-03-05 PROCEDURE — 25800030 ZZH RX IP 258 OP 636: Performed by: FAMILY MEDICINE

## 2019-03-05 PROCEDURE — 25000128 H RX IP 250 OP 636: Performed by: FAMILY MEDICINE

## 2019-03-05 PROCEDURE — 71045 X-RAY EXAM CHEST 1 VIEW: CPT | Mod: TC

## 2019-03-05 PROCEDURE — 93010 ELECTROCARDIOGRAM REPORT: CPT | Performed by: INTERNAL MEDICINE

## 2019-03-05 PROCEDURE — 96360 HYDRATION IV INFUSION INIT: CPT

## 2019-03-05 PROCEDURE — 84443 ASSAY THYROID STIM HORMONE: CPT | Performed by: FAMILY MEDICINE

## 2019-03-05 PROCEDURE — 80053 COMPREHEN METABOLIC PANEL: CPT | Performed by: FAMILY MEDICINE

## 2019-03-05 PROCEDURE — 93005 ELECTROCARDIOGRAM TRACING: CPT

## 2019-03-05 PROCEDURE — 36415 COLL VENOUS BLD VENIPUNCTURE: CPT | Performed by: FAMILY MEDICINE

## 2019-03-05 PROCEDURE — 96361 HYDRATE IV INFUSION ADD-ON: CPT

## 2019-03-05 PROCEDURE — 84484 ASSAY OF TROPONIN QUANT: CPT | Mod: 91 | Performed by: FAMILY MEDICINE

## 2019-03-05 PROCEDURE — 99284 EMERGENCY DEPT VISIT MOD MDM: CPT | Mod: Z6 | Performed by: FAMILY MEDICINE

## 2019-03-05 PROCEDURE — 85025 COMPLETE CBC W/AUTO DIFF WBC: CPT | Performed by: FAMILY MEDICINE

## 2019-03-05 PROCEDURE — 85379 FIBRIN DEGRADATION QUANT: CPT | Performed by: FAMILY MEDICINE

## 2019-03-05 RX ORDER — SODIUM CHLORIDE 9 MG/ML
1000 INJECTION, SOLUTION INTRAVENOUS CONTINUOUS
Status: DISCONTINUED | OUTPATIENT
Start: 2019-03-05 | End: 2019-03-05 | Stop reason: HOSPADM

## 2019-03-05 RX ADMIN — SODIUM CHLORIDE 1000 ML: 9 INJECTION, SOLUTION INTRAVENOUS at 16:42

## 2019-03-05 RX ADMIN — SODIUM CHLORIDE 1000 ML: 9 INJECTION, SOLUTION INTRAVENOUS at 18:06

## 2019-03-05 ASSESSMENT — ENCOUNTER SYMPTOMS
PALPITATIONS: 1
NAUSEA: 0
PSYCHIATRIC NEGATIVE: 1
CONSTIPATION: 0
FREQUENCY: 0
FEVER: 0
ACTIVITY CHANGE: 1
DYSURIA: 0
DIARRHEA: 0
DIAPHORESIS: 0
ABDOMINAL PAIN: 0
VOMITING: 0
SHORTNESS OF BREATH: 1
BACK PAIN: 0
NEUROLOGICAL NEGATIVE: 1
NECK PAIN: 0

## 2019-03-05 NOTE — ED NOTES
Ambulated to room 9 independently, accompanied by , gown placed, call light in reach.  Stated feels like heart stops, episode happened x 3 today.  Increasing over the past week.  Denies chest pain or nausea.  SOB with each episode.  Intermittent dizziness.

## 2019-03-05 NOTE — ED TRIAGE NOTES
Feeling of irregular heart for one week, worsened today. Denies chest pain, some intermittent shortness of breath.

## 2019-03-05 NOTE — ED PROVIDER NOTES
"  History     Chief Complaint   Patient presents with     Palpitations     HPI  Ivonnemari Kirkland is a 68 year old female who has been having issues with palpitations that started this about a week ago patient has shortness of breath with each episode and has had intermittent dizziness.  She has not had any chest pain or.  Today she had these episodes 3 times, she states it feels like \"her heart stops\".  Nausea, no diaphoresis or near syncope.  She is quite anxious at this time.    Allergies:  Allergies   Allergen Reactions     Cefuroxime Other (See Comments) and Nausea     dizziness     Clindamycin Hcl Diarrhea     Codeine Sulfate Other (See Comments)     Passed out     Morphine Sulfate Nausea and Vomiting       Problem List:    There are no active problems to display for this patient.       Past Medical History:    No past medical history on file.    Past Surgical History:    Past Surgical History:   Procedure Laterality Date     CHOLECYSTECTOMY       GYN SURGERY      hysterectomy     ORTHOPEDIC SURGERY      right wrist fracture     ORTHOPEDIC SURGERY      left elbow cyst     SOFT TISSUE SURGERY      I and D cysts on back and chest       Family History:    Family History   Problem Relation Age of Onset     Diabetes Mother      Diabetes Maternal Grandfather      Hypertension No family hx of      Hyperlipidemia No family hx of      Asthma No family hx of      Thyroid Disease No family hx of        Social History:  Marital Status:   [2]  Social History     Tobacco Use     Smoking status: Current Every Day Smoker     Packs/day: 0.50     Smokeless tobacco: Never Used     Tobacco comment: referral entered for Quit Plan (3/9/17) - pt has been trying to quit   Substance Use Topics     Alcohol use: Yes     Alcohol/week: 0.0 oz     Comment: occassional     Drug use: No        Medications:      ASPIRIN PO         Review of Systems   Constitutional: Positive for activity change. Negative for diaphoresis and fever. "   HENT: Negative.    Respiratory: Positive for shortness of breath.         See HPI   Cardiovascular: Positive for palpitations. Negative for chest pain and leg swelling.   Gastrointestinal: Negative for abdominal pain, constipation, diarrhea, nausea and vomiting.   Genitourinary: Negative for dysuria, frequency and urgency.   Musculoskeletal: Negative for back pain and neck pain.   Neurological: Negative.    Psychiatric/Behavioral: Negative.        Physical Exam   BP: 160/86  Pulse: 86  Heart Rate: 82  Temp: 97.5  F (36.4  C)  Resp: 18  SpO2: 100 %      Physical Exam   Constitutional: She is oriented to person, place, and time. She appears well-developed and well-nourished. No distress.   HENT:   Head: Normocephalic and atraumatic.   Neck: Normal range of motion. Neck supple.   Cardiovascular: Normal rate, regular rhythm and normal heart sounds.   No murmur heard.  Pulmonary/Chest: Effort normal and breath sounds normal. No respiratory distress.   Abdominal: Soft. Bowel sounds are normal. She exhibits no distension. There is no tenderness.   Musculoskeletal: Normal range of motion.   Neurological: She is alert and oriented to person, place, and time.   Skin: Skin is warm and dry. Capillary refill takes less than 2 seconds.   Psychiatric: Her mood appears anxious.   Nursing note and vitals reviewed.      ED Course        Procedures         EKG Interpretation:      Interpreted by Gabrielle Curiel  Time reviewed: 1610  Symptoms at time of EKG: palpitations  Rhythm: normal sinus   Rate: normal  Axis: normal  Ectopy: none  Conduction: normal  ST Segments/ T Waves: No ST-T wave changes  Q Waves: none  Comparison to prior: No old EKG available    Clinical Impression: normal EKG    Results for orders placed or performed during the hospital encounter of 03/05/19 (from the past 24 hour(s))   CBC with platelets differential   Result Value Ref Range    WBC 5.5 4.0 - 11.0 10e9/L    RBC Count 4.15 3.8 - 5.2 10e12/L     Hemoglobin 13.0 11.7 - 15.7 g/dL    Hematocrit 37.4 35.0 - 47.0 %    MCV 90 78 - 100 fl    MCH 31.3 26.5 - 33.0 pg    MCHC 34.8 31.5 - 36.5 g/dL    RDW 12.5 10.0 - 15.0 %    Platelet Count 213 150 - 450 10e9/L    Diff Method Automated Method     % Neutrophils 67.0 %    % Lymphocytes 17.9 %    % Monocytes 10.6 %    % Eosinophils 3.8 %    % Basophils 0.5 %    % Immature Granulocytes 0.2 %    Nucleated RBCs 0 0 /100    Absolute Neutrophil 3.7 1.6 - 8.3 10e9/L    Absolute Lymphocytes 1.0 0.8 - 5.3 10e9/L    Absolute Monocytes 0.6 0.0 - 1.3 10e9/L    Absolute Eosinophils 0.2 0.0 - 0.7 10e9/L    Absolute Basophils 0.0 0.0 - 0.2 10e9/L    Abs Immature Granulocytes 0.0 0 - 0.4 10e9/L    Absolute Nucleated RBC 0.0    Comprehensive metabolic panel   Result Value Ref Range    Sodium 136 133 - 144 mmol/L    Potassium 4.1 3.4 - 5.3 mmol/L    Chloride 102 94 - 109 mmol/L    Carbon Dioxide 25 20 - 32 mmol/L    Anion Gap 9 3 - 14 mmol/L    Glucose 99 70 - 99 mg/dL    Urea Nitrogen 23 7 - 30 mg/dL    Creatinine 0.74 0.52 - 1.04 mg/dL    GFR Estimate 82 >60 mL/min/[1.73_m2]    GFR Estimate If Black >90 >60 mL/min/[1.73_m2]    Calcium 8.3 (L) 8.5 - 10.1 mg/dL    Bilirubin Total 0.3 0.2 - 1.3 mg/dL    Albumin 3.7 3.4 - 5.0 g/dL    Protein Total 6.6 (L) 6.8 - 8.8 g/dL    Alkaline Phosphatase 100 40 - 150 U/L    ALT 19 0 - 50 U/L    AST 14 0 - 45 U/L   Troponin I   Result Value Ref Range    Troponin I ES <0.015 0.000 - 0.045 ug/L   TSH with free T4 reflex   Result Value Ref Range    TSH 1.35 0.40 - 4.00 mU/L   D-Dimer ([facility])   Result Value Ref Range    D-Dimer ng/mL <200 0 - 300 ng/ml D-DU   XR Chest Port 1 View    Narrative    XR CHEST PORT 1 VW    HISTORY: 68 yearsFemale palpitations    TECHNIQUE: A single view of the chest was performed    COMPARISON: None    FINDINGS: Heart size and pulmonary vascularity are within normal  limits. Lungs are clear. No consolidating airspace opacities are  present.      Impression    IMPRESSION: Clear  "chest    MAURILIO STUART MD   Troponin I   Result Value Ref Range    Troponin I ES <0.015 0.000 - 0.045 ug/L       Medications   0.9% sodium chloride BOLUS (0 mLs Intravenous Stopped 3/5/19 1804)     Followed by   sodium chloride 0.9% infusion (0 mLs Intravenous Stopped 3/5/19 2018)       Assessments & Plan (with Medical Decision Making)   Patient has had a few isolated PVCs while here but otherwise no ectopy.  She is still very concerned about this feeling that her heart is \"stopping\", will have a Zio patch placed for 14 days so that the rhythm can be evaluated, order placed in the computer for this.  We will have her follow-up with Dr. Shipley the end of this week.  No new medications at this time.  Blood pressure has been stable.  I have reviewed the nursing notes.    I have reviewed the findings, diagnosis, plan and need for follow up with the patient.     Medication List      There are no discharge medications for this visit.         Final diagnoses:   Palpitations       3/5/2019   [facility]     Gabrielle Ballard MD  03/05/19 2026    "

## 2019-03-05 NOTE — ED NOTES
"Patient walked one lap around the unit. She C/O mid-sternal chest pressure before returning to her room. She denied the sensation of \"her heart stopping\" that she had experienced previously. Patient was placed on the cardiac monitor, which read sinus rhythm at 93. MD made aware.   "

## 2019-03-05 NOTE — ED AVS SNAPSHOT
[facility]  [address] [place] [id number]  Phone:  [phone]                                    Ivonne Kirkland   MRN: [id number]    Department:  [facility]   Date of Visit:  3/5/2019           After Visit Summary Signature Page    I have received my discharge instructions, and my questions have been answered. I have discussed any challenges I see with this plan with the nurse or doctor.    ..........................................................................................................................................  Patient/Patient Representative Signature      ..........................................................................................................................................  Patient Representative Print Name and Relationship to Patient    ..................................................               ................................................  Date                                   Time    ..........................................................................................................................................  Reviewed by Signature/Title    ...................................................              ..............................................  Date                                               Time          22EPIC Rev 08/18

## 2019-03-06 NOTE — ED NOTES
Patient given verbal and written discharge instructions and patient verbalizes understanding. Patient left department ambulatory.

## 2019-03-06 NOTE — ED NOTES
Face to face report given with opportunity to observe patient.    Report given to THELMA Cramer / THELMA London   3/5/2019  7:00 PM

## 2019-03-07 ENCOUNTER — HOSPITAL ENCOUNTER (OUTPATIENT)
Dept: CARDIOLOGY | Facility: HOSPITAL | Age: 69
Discharge: HOME | End: 2019-03-07
Attending: FAMILY MEDICINE | Admitting: FAMILY MEDICINE
Payer: MEDICARE

## 2019-03-07 DIAGNOSIS — R00.2 PALPITATIONS: ICD-10-CM

## 2019-03-07 PROCEDURE — 0296T ZIO PATCH HOLTER ADULT PEDIATRIC GREATER THAN 48 HRS: CPT | Mod: TC

## 2019-03-07 PROCEDURE — 0298T ZIO PATCH HOLTER ADULT PEDIATRIC GREATER THAN 48 HRS: CPT | Performed by: INTERNAL MEDICINE

## 2019-04-16 ENCOUNTER — TRANSFERRED RECORDS (OUTPATIENT)
Dept: HEALTH INFORMATION MANAGEMENT | Facility: CLINIC | Age: 69
End: 2019-04-16

## 2019-04-19 ENCOUNTER — TRANSFERRED RECORDS (OUTPATIENT)
Dept: HEALTH INFORMATION MANAGEMENT | Facility: CLINIC | Age: 69
End: 2019-04-19

## 2019-04-23 ENCOUNTER — TRANSFERRED RECORDS (OUTPATIENT)
Dept: HEALTH INFORMATION MANAGEMENT | Facility: CLINIC | Age: 69
End: 2019-04-23

## 2019-04-23 LAB
ALT SERPL-CCNC: 17 U/L (ref 18–65)
AST SERPL-CCNC: 18 U/L (ref 10–30)
CHOLEST SERPL-MCNC: 179 MG/DL
CREAT SERPL-MCNC: 0.81 MG/DL (ref 0.7–1.2)
GFR SERPL CREATININE-BSD FRML MDRD: >60 ML/MIN/1.73M2
HDLC SERPL-MCNC: 49 MG/DL
LDLC SERPL CALC-MCNC: 114 MG/DL
POTASSIUM SERPL-SCNC: 4.1 MEQ/L (ref 3.5–5.1)
TRIGL SERPL-MCNC: 79 MG/DL

## 2019-04-25 ENCOUNTER — TRANSFERRED RECORDS (OUTPATIENT)
Dept: HEALTH INFORMATION MANAGEMENT | Facility: CLINIC | Age: 69
End: 2019-04-25

## 2019-04-30 ENCOUNTER — TRANSFERRED RECORDS (OUTPATIENT)
Dept: HEALTH INFORMATION MANAGEMENT | Facility: CLINIC | Age: 69
End: 2019-04-30

## 2019-05-14 ENCOUNTER — TRANSFERRED RECORDS (OUTPATIENT)
Dept: HEALTH INFORMATION MANAGEMENT | Facility: CLINIC | Age: 69
End: 2019-05-14

## 2019-05-14 LAB — COLOGUARD-ABSTRACT: POSITIVE

## 2019-05-31 ENCOUNTER — HOSPITAL ENCOUNTER (OUTPATIENT)
Facility: HOSPITAL | Age: 69
End: 2019-05-31
Attending: INTERNAL MEDICINE | Admitting: INTERNAL MEDICINE
Payer: MEDICARE

## 2020-07-17 ENCOUNTER — TRANSFERRED RECORDS (OUTPATIENT)
Dept: HEALTH INFORMATION MANAGEMENT | Facility: CLINIC | Age: 70
End: 2020-07-17

## 2020-08-13 ENCOUNTER — HOSPITAL ENCOUNTER (EMERGENCY)
Facility: HOSPITAL | Age: 70
Discharge: HOME | End: 2020-08-13
Attending: PHYSICIAN ASSISTANT | Admitting: PHYSICIAN ASSISTANT
Payer: MEDICARE

## 2020-08-13 VITALS
RESPIRATION RATE: 18 BRPM | TEMPERATURE: 98.2 F | OXYGEN SATURATION: 98 % | SYSTOLIC BLOOD PRESSURE: 157 MMHG | DIASTOLIC BLOOD PRESSURE: 78 MMHG | HEART RATE: 60 BPM

## 2020-08-13 DIAGNOSIS — R20.0 LEFT ARM NUMBNESS: ICD-10-CM

## 2020-08-13 DIAGNOSIS — H53.8 BLURRED VISION: ICD-10-CM

## 2020-08-13 DIAGNOSIS — R20.0 LEFT LEG NUMBNESS: ICD-10-CM

## 2020-08-13 LAB
ANION GAP SERPL CALCULATED.3IONS-SCNC: 4 MMOL/L (ref 3–14)
BASOPHILS # BLD AUTO: 0 10E9/L (ref 0–0.2)
BASOPHILS NFR BLD AUTO: 0.6 %
BUN SERPL-MCNC: 14 MG/DL (ref 7–30)
CALCIUM SERPL-MCNC: 8.4 MG/DL (ref 8.5–10.1)
CHLORIDE SERPL-SCNC: 104 MMOL/L (ref 94–109)
CO2 SERPL-SCNC: 28 MMOL/L (ref 20–32)
CREAT SERPL-MCNC: 0.83 MG/DL (ref 0.52–1.04)
DIFFERENTIAL METHOD BLD: ABNORMAL
EOSINOPHIL # BLD AUTO: 0.2 10E9/L (ref 0–0.7)
EOSINOPHIL NFR BLD AUTO: 3.5 %
ERYTHROCYTE [DISTWIDTH] IN BLOOD BY AUTOMATED COUNT: 12.4 % (ref 10–15)
GFR SERPL CREATININE-BSD FRML MDRD: 71 ML/MIN/{1.73_M2}
GLUCOSE SERPL-MCNC: 157 MG/DL (ref 70–99)
HCT VFR BLD AUTO: 40.3 % (ref 35–47)
HGB BLD-MCNC: 13.7 G/DL (ref 11.7–15.7)
IMM GRANULOCYTES # BLD: 0 10E9/L (ref 0–0.4)
IMM GRANULOCYTES NFR BLD: 0.4 %
LYMPHOCYTES # BLD AUTO: 0.6 10E9/L (ref 0.8–5.3)
LYMPHOCYTES NFR BLD AUTO: 10.7 %
MCH RBC QN AUTO: 31.4 PG (ref 26.5–33)
MCHC RBC AUTO-ENTMCNC: 34 G/DL (ref 31.5–36.5)
MCV RBC AUTO: 92 FL (ref 78–100)
MONOCYTES # BLD AUTO: 0.4 10E9/L (ref 0–1.3)
MONOCYTES NFR BLD AUTO: 6.6 %
NEUTROPHILS # BLD AUTO: 4.3 10E9/L (ref 1.6–8.3)
NEUTROPHILS NFR BLD AUTO: 78.2 %
NRBC # BLD AUTO: 0 10*3/UL
NRBC BLD AUTO-RTO: 0 /100
PLATELET # BLD AUTO: 208 10E9/L (ref 150–450)
POTASSIUM SERPL-SCNC: 3.8 MMOL/L (ref 3.4–5.3)
RBC # BLD AUTO: 4.36 10E12/L (ref 3.8–5.2)
SODIUM SERPL-SCNC: 136 MMOL/L (ref 133–144)
WBC # BLD AUTO: 5.4 10E9/L (ref 4–11)

## 2020-08-13 PROCEDURE — 99284 EMERGENCY DEPT VISIT MOD MDM: CPT | Mod: Z6 | Performed by: PHYSICIAN ASSISTANT

## 2020-08-13 PROCEDURE — 99284 EMERGENCY DEPT VISIT MOD MDM: CPT

## 2020-08-13 PROCEDURE — 36415 COLL VENOUS BLD VENIPUNCTURE: CPT | Performed by: PHYSICIAN ASSISTANT

## 2020-08-13 PROCEDURE — 85025 COMPLETE CBC W/AUTO DIFF WBC: CPT | Performed by: PHYSICIAN ASSISTANT

## 2020-08-13 PROCEDURE — 93005 ELECTROCARDIOGRAM TRACING: CPT

## 2020-08-13 PROCEDURE — 93010 ELECTROCARDIOGRAM REPORT: CPT | Performed by: INTERNAL MEDICINE

## 2020-08-13 PROCEDURE — 80048 BASIC METABOLIC PNL TOTAL CA: CPT | Performed by: PHYSICIAN ASSISTANT

## 2020-08-13 NOTE — ED PROVIDER NOTES
History     Chief Complaint   Patient presents with     Eye Problem     Numbness     HPI  Ivonne Kirkland is a 70 year old female who presents emergency department with complaints of left arm numbness for over a week.  She states that change in sensation has gotten progressively worse and that within the last 2 days she has experienced decreased sensation in the left leg as well.  Patient states that she had blurred vision this morning and is unable to identify if it was unilateral or bilateral.  She states the blurred vision has resolved.  Patient has no prior history of stroke.  She has no personal or family history of multiple sclerosis.  She has not been experiencing chest pain, shortness of breath.    Patient reports she is extremely claustrophobic and would be unable to tolerate an MRI unless she were completely unconscious.    Allergies:  Allergies   Allergen Reactions     Cefuroxime Other (See Comments) and Nausea     dizziness     Clindamycin Hcl Diarrhea     Codeine Sulfate Other (See Comments)     Passed out     Morphine Sulfate Nausea and Vomiting       Problem List:    There are no active problems to display for this patient.       Past Medical History:    No past medical history on file.    Past Surgical History:    Past Surgical History:   Procedure Laterality Date     CHOLECYSTECTOMY       GYN SURGERY      hysterectomy     ORTHOPEDIC SURGERY      right wrist fracture     ORTHOPEDIC SURGERY      left elbow cyst     SOFT TISSUE SURGERY      I and D cysts on back and chest       Family History:    Family History   Problem Relation Age of Onset     Diabetes Mother      Diabetes Maternal Grandfather      Hypertension No family hx of      Hyperlipidemia No family hx of      Asthma No family hx of      Thyroid Disease No family hx of        Social History:  Marital Status:   [2]  Social History     Tobacco Use     Smoking status: Current Every Day Smoker     Packs/day: 0.50     Smokeless tobacco:  Never Used     Tobacco comment: referral entered for Quit Plan (3/9/17) - pt has been trying to quit   Substance Use Topics     Alcohol use: Yes     Alcohol/week: 0.0 standard drinks     Comment: occassional     Drug use: No        Medications:    ASPIRIN PO  fluticasone-salmeterol (ADVAIR) 100-50 MCG/DOSE inhaler  tiotropium (SPIRIVA RESPIMAT) 2.5 MCG/ACT inhaler          Review of Systems   Constitutional: Negative.    HENT: Negative.    Eyes: Positive for visual disturbance. Negative for pain.   Respiratory: Negative.    Cardiovascular: Negative.    Gastrointestinal: Negative.    Neurological: Positive for numbness. Negative for dizziness, facial asymmetry, weakness, light-headedness and headaches.       Physical Exam   BP: (!) 181/95  Pulse: 77  Heart Rate: 78  Temp: 96.2  F (35.7  C)  Resp: 16  SpO2: 96 %      Physical Exam  Constitutional:       General: She is not in acute distress.     Appearance: She is well-developed. She is not diaphoretic.   HENT:      Head: Normocephalic and atraumatic.   Eyes:      General: No scleral icterus.     Extraocular Movements: Extraocular movements intact.      Conjunctiva/sclera: Conjunctivae normal.      Pupils: Pupils are equal, round, and reactive to light.   Neck:      Musculoskeletal: Normal range of motion and neck supple.   Cardiovascular:      Rate and Rhythm: Normal rate and regular rhythm.      Pulses: Normal pulses.      Heart sounds: Normal heart sounds.   Pulmonary:      Effort: Pulmonary effort is normal.      Breath sounds: Normal breath sounds.   Musculoskeletal: Normal range of motion.   Skin:     General: Skin is warm and dry.      Coloration: Skin is not pale.      Findings: No erythema or rash.   Neurological:      General: No focal deficit present.      Mental Status: She is alert and oriented to person, place, and time.      Cranial Nerves: No cranial nerve deficit.      Motor: No weakness.      Coordination: Coordination normal.      Gait: Gait normal.       Comments: Patient can feel light touch on the left but notes there is a definite loss of sensation compared to right.         ED Course        Procedures  Patient is experiencing progressing left sided numbness and transient vision changes.  Symptoms present for over a week.  No need for emergent CT at this time.  Outpatient MRI ordered with option for sedation due to severe claustrophobia.    Patient to return to ED if worsening symptoms.               EKG Interpretation:      Interpreted by KALEB Nunez  Time reviewed: 1202  Symptoms at time of EKG: Left arm numbness   Rhythm: normal sinus   Rate: 62 bpm  Axis: normal  Ectopy: none  Conduction: normal  ST Segments/ T Waves: No ST-T wave changes  Q Waves: none  Comparison to prior: Unchanged from 3/5/19    Clinical Impression: normal EKG               Results for orders placed or performed during the hospital encounter of 08/13/20 (from the past 24 hour(s))   CBC with platelets differential   Result Value Ref Range    WBC 5.4 4.0 - 11.0 10e9/L    RBC Count 4.36 3.8 - 5.2 10e12/L    Hemoglobin 13.7 11.7 - 15.7 g/dL    Hematocrit 40.3 35.0 - 47.0 %    MCV 92 78 - 100 fl    MCH 31.4 26.5 - 33.0 pg    MCHC 34.0 31.5 - 36.5 g/dL    RDW 12.4 10.0 - 15.0 %    Platelet Count 208 150 - 450 10e9/L    Diff Method Automated Method     % Neutrophils 78.2 %    % Lymphocytes 10.7 %    % Monocytes 6.6 %    % Eosinophils 3.5 %    % Basophils 0.6 %    % Immature Granulocytes 0.4 %    Nucleated RBCs 0 0 /100    Absolute Neutrophil 4.3 1.6 - 8.3 10e9/L    Absolute Lymphocytes 0.6 (L) 0.8 - 5.3 10e9/L    Absolute Monocytes 0.4 0.0 - 1.3 10e9/L    Absolute Eosinophils 0.2 0.0 - 0.7 10e9/L    Absolute Basophils 0.0 0.0 - 0.2 10e9/L    Abs Immature Granulocytes 0.0 0 - 0.4 10e9/L    Absolute Nucleated RBC 0.0    Basic metabolic panel   Result Value Ref Range    Sodium 136 133 - 144 mmol/L    Potassium 3.8 3.4 - 5.3 mmol/L    Chloride 104 94 - 109 mmol/L    Carbon Dioxide 28 20  - 32 mmol/L    Anion Gap 4 3 - 14 mmol/L    Glucose 157 (H) 70 - 99 mg/dL    Urea Nitrogen 14 7 - 30 mg/dL    Creatinine 0.83 0.52 - 1.04 mg/dL    GFR Estimate 71 >60 mL/min/[1.73_m2]    GFR Estimate If Black 82 >60 mL/min/[1.73_m2]    Calcium 8.4 (L) 8.5 - 10.1 mg/dL       Medications - No data to display    Assessments & Plan (with Medical Decision Making)     I have reviewed the nursing notes.    I have reviewed the findings, diagnosis, plan and need for follow up with the patient.    Discharge Medication List as of 8/13/2020  1:12 PM          Final diagnoses:   Left arm numbness   Left leg numbness   Blurred vision     KALEB Nunez on 8/14/2020 at 11:30 AM   8/13/2020   [facility]     Tree Bruce PA  08/14/20 1134

## 2020-08-13 NOTE — ED AVS SNAPSHOT
[facility]  [address] [address]  Phone:  [phone]                                    Ivonne Kirkland   MRN: [id number]    Department:  [facility]   Date of Visit:  8/13/2020           After Visit Summary Signature Page    I have received my discharge instructions, and my questions have been answered. I have discussed any challenges I see with this plan with the nurse or doctor.    ..........................................................................................................................................  Patient/Patient Representative Signature      ..........................................................................................................................................  Patient Representative Print Name and Relationship to Patient    ..................................................               ................................................  Date                                   Time    ..........................................................................................................................................  Reviewed by Signature/Title    ...................................................              ..............................................  Date                                               Time          22EPIC Rev 08/18

## 2020-08-13 NOTE — ED NOTES
"Presents with progressive weakness and numbness to L side. Started in L arm, now she feels \"tingling under my skin\" in L thigh. Intermittent blurred vision since yesterday at 1430. Patient reports severe claustrophobia stating \"no way will I go in that.\" Denies blurred vision at this time.  "

## 2020-08-13 NOTE — ED TRIAGE NOTES
Pt states she has been having left arm numbness off and on for 2 weeks. States blurred vision started yesterday around 1430.

## 2020-08-14 ENCOUNTER — DOCUMENTATION ONLY (OUTPATIENT)
Dept: INTERVENTIONAL RADIOLOGY/VASCULAR | Facility: HOSPITAL | Age: 70
End: 2020-08-14

## 2020-08-14 ENCOUNTER — HOSPITAL ENCOUNTER (OUTPATIENT)
Facility: HOSPITAL | Age: 70
End: 2020-08-14
Payer: MEDICARE

## 2020-08-14 ASSESSMENT — ENCOUNTER SYMPTOMS
WEAKNESS: 0
CARDIOVASCULAR NEGATIVE: 1
CONSTITUTIONAL NEGATIVE: 1
DIZZINESS: 0
NUMBNESS: 1
GASTROINTESTINAL NEGATIVE: 1
RESPIRATORY NEGATIVE: 1
LIGHT-HEADEDNESS: 0
FACIAL ASYMMETRY: 0
EYE PAIN: 0
HEADACHES: 0

## 2020-08-14 NOTE — PROGRESS NOTES
Message left for patient to return our call regarding an appointment.  Patient has an MRI with sedation ordered.

## 2020-08-17 ENCOUNTER — TRANSFERRED RECORDS (OUTPATIENT)
Dept: HEALTH INFORMATION MANAGEMENT | Facility: CLINIC | Age: 70
End: 2020-08-17

## 2020-08-17 LAB
ALT SERPL-CCNC: 18 U/L (ref 18–65)
AST SERPL-CCNC: 24 U/L (ref 10–30)
CREAT SERPL-MCNC: 0.81 MG/DL (ref 0.7–1.2)
GLUCOSE SERPL-MCNC: 91 MG/DL (ref 60–99)
HBA1C MFR BLD: 5.2 % (ref 0–5.7)
POTASSIUM SERPL-SCNC: 4.2 MMOL/L (ref 3.5–5.1)
TSH SERPL-ACNC: 1.27 MIU/ML (ref 0.35–4.8)

## 2020-08-24 NOTE — PROGRESS NOTES
"Subjective     Ivonne Kirkland is a 70 year old female who presents to clinic today for the following health issues:      Patient is not here to est care. She would like to est. With marley, patient states. But she needs referrals.     Previous PCP: Dr. Shipley at [facility]. JABIER signed for records.     .   Review of Systems   COPD      Duration: April 2019  DX with copd .     Description (location/character/radiation): gets palpitations more at night when going to bed sometimes during day. Palpitations have improved since being seen in ER    Intensity:  moderate    Accompanying signs and symptoms: patient states it started once she started spiriva she started this palitations. Than she takes an asprin and in 20 min its better.     History (similar episodes/previous evaluation): dr shipley.     Precipitating or alleviating factors: None    Therapies tried and outcome: aspirin .    Patient would like a referral to pulmonology to Dr Audrey Davenport at [facility] in [place], [place]    She was seen in the ER at this facility on 8- and was having chest palpations. Normal EKG in ER. Palpitations have improved since being seen in the ER. She wants no further testing at this point until she sees pulmonology. She feels palpations are from COPD       Itchy Buttocks      Duration: few weeks     Description (location/character/radiation): she went camping for 2 weeks and used a outhouse that was dirty and she has been itchy ever since. Denies any discharge or odor.     Intensity:  mild    Accompanying signs and symptoms: itchyness     History (similar episodes/previous evaluation): None    Precipitating or alleviating factors: None    Therapies tried and outcome: hydrocortisone cream and peroxide without relief    Denies vaginal symptoms      Last physical was \"awhile\" ago. JABIER signed for records.     Constitutional, HEENT, cardiovascular, pulmonary, gi and gu systems are negative, except as otherwise " "noted. +buttocks itching. +COPD        Objective    /82   Pulse 83   Temp 97.3  F (36.3  C)   Ht 1.654 m (5' 5.1\")   Wt 82.1 kg (181 lb)   SpO2 97%   BMI 30.03 kg/m    Body mass index is 30.03 kg/m .  Physical Exam   GENERAL: healthy, alert and no distress  NECK: no adenopathy, no asymmetry, masses, or scars and thyroid normal to palpation  RESP: lungs clear to auscultation - no rales, rhonchi or wheezes  CV: regular rate and rhythm, normal S1 S2, no S3 or S4, no murmur, click or rub, no peripheral edema and peripheral pulses strong  BUTTOCKS: +irritation at top of gluteal cleft. No pilonidal cyst or fluctuation of skin. No rash, erythema, bruising, or warmth to the touch  MS: no gross musculoskeletal defects noted, no edema  SKIN: no suspicious lesions or rashes  NEURO: Normal strength and tone, mentation intact and speech normal  PSYCH: mentation appears normal, affect normal/bright    Offered to check labs today including thyroid. She deferred. She wants to wait to see  (pulomonologist) first.     Her appointment today was to establish care. When the nurse roomed her she told her she wasn't here to establish care and that she wants to establish care with Dr. Calvin Torres.  I told her that I would do a pulmonology referral to Dr. Davenport pulmonologist at [facility] and schedule her for an establish care appointment with Dr. Torres. She told me that she prefers to see a female provider and would like to establish care with me.     Medical, surgical, and family history reviewed and updated in EPIC. Medications reviewed. She is taking Advair and Spiriva PRN for COPD. She doesn't have an Albuterol inhaler. Discussed other management for COPD with Singulair for triggers. She would like to see pulmonology first.     Assessment & Plan     (J44.9) Chronic obstructive pulmonary disease, unspecified COPD type (H)  (primary encounter diagnosis)  Comment: pulmonology referral for further management. " "JABIER for [facility] signed. Will review PFT when available  Plan: albuterol (PROAIR HFA/PROVENTIL HFA/VENTOLIN         HFA) 108 (90 Base) MCG/ACT inhaler, PULMONARY MEDICINE         REFERRAL            (L29.9) Pruritic disorder  Comment: use kenalog prn and no longer than 2 weeks. Keep skin clean and dry  Plan: triamcinolone (KENALOG) 0.1 % external ointment            (Z76.89) Encounter to establish care  Comment: care established  Plan: as above         BMI:   Estimated body mass index is 30.03 kg/m  as calculated from the following:    Height as of this encounter: 1.654 m (5' 5.1\").    Weight as of this encounter: 82.1 kg (181 lb).   Weight management plan: Discussed healthy diet and exercise guidelines        See Patient Instructions    Return in about 1 month (around 9/28/2020) for Physical Exam. Fasting.    Bela Sandhu NP  [facility]    "

## 2020-08-25 ENCOUNTER — OFFICE VISIT (OUTPATIENT)
Dept: FAMILY MEDICINE | Facility: OTHER | Age: 70
End: 2020-08-25
Attending: NURSE PRACTITIONER
Payer: MEDICARE

## 2020-08-25 DIAGNOSIS — Z01.818 PREOP GENERAL PHYSICAL EXAM: Primary | ICD-10-CM

## 2020-08-25 PROCEDURE — U0003 INFECTIOUS AGENT DETECTION BY NUCLEIC ACID (DNA OR RNA); SEVERE ACUTE RESPIRATORY SYNDROME CORONAVIRUS 2 (SARS-COV-2) (CORONAVIRUS DISEASE [COVID-19]), AMPLIFIED PROBE TECHNIQUE, MAKING USE OF HIGH THROUGHPUT TECHNOLOGIES AS DESCRIBED BY CMS-2020-01-R: HCPCS | Mod: ZL | Performed by: FAMILY MEDICINE

## 2020-08-26 LAB
SARS-COV-2 RNA SPEC QL NAA+PROBE: NOT DETECTED
SPECIMEN SOURCE: NORMAL

## 2020-08-28 ENCOUNTER — OFFICE VISIT (OUTPATIENT)
Dept: FAMILY MEDICINE | Facility: OTHER | Age: 70
End: 2020-08-28
Attending: NURSE PRACTITIONER
Payer: MEDICARE

## 2020-08-28 VITALS
SYSTOLIC BLOOD PRESSURE: 128 MMHG | DIASTOLIC BLOOD PRESSURE: 82 MMHG | BODY MASS INDEX: 30.16 KG/M2 | OXYGEN SATURATION: 97 % | HEART RATE: 83 BPM | HEIGHT: 65 IN | TEMPERATURE: 97.3 F | WEIGHT: 181 LBS

## 2020-08-28 DIAGNOSIS — J44.9 CHRONIC OBSTRUCTIVE PULMONARY DISEASE, UNSPECIFIED COPD TYPE (H): Primary | ICD-10-CM

## 2020-08-28 DIAGNOSIS — L29.9 PRURITIC DISORDER: ICD-10-CM

## 2020-08-28 DIAGNOSIS — Z76.89 ENCOUNTER TO ESTABLISH CARE: ICD-10-CM

## 2020-08-28 PROCEDURE — G0463 HOSPITAL OUTPT CLINIC VISIT: HCPCS

## 2020-08-28 PROCEDURE — 99214 OFFICE O/P EST MOD 30 MIN: CPT | Performed by: NURSE PRACTITIONER

## 2020-08-28 RX ORDER — TRIAMCINOLONE ACETONIDE 1 MG/G
OINTMENT TOPICAL 2 TIMES DAILY
Qty: 80 G | Refills: 0 | Status: SHIPPED | OUTPATIENT
Start: 2020-08-28 | End: 2020-09-30

## 2020-08-28 RX ORDER — ALBUTEROL SULFATE 90 UG/1
2 AEROSOL, METERED RESPIRATORY (INHALATION) EVERY 6 HOURS
Qty: 18 G | Refills: 0 | Status: SHIPPED | OUTPATIENT
Start: 2020-08-28 | End: 2021-01-15

## 2020-08-28 ASSESSMENT — ANXIETY QUESTIONNAIRES
2. NOT BEING ABLE TO STOP OR CONTROL WORRYING: NOT AT ALL
3. WORRYING TOO MUCH ABOUT DIFFERENT THINGS: SEVERAL DAYS
5. BEING SO RESTLESS THAT IT IS HARD TO SIT STILL: NOT AT ALL
1. FEELING NERVOUS, ANXIOUS, OR ON EDGE: NOT AT ALL
6. BECOMING EASILY ANNOYED OR IRRITABLE: NOT AT ALL
7. FEELING AFRAID AS IF SOMETHING AWFUL MIGHT HAPPEN: NOT AT ALL
GAD7 TOTAL SCORE: 2
IF YOU CHECKED OFF ANY PROBLEMS ON THIS QUESTIONNAIRE, HOW DIFFICULT HAVE THESE PROBLEMS MADE IT FOR YOU TO DO YOUR WORK, TAKE CARE OF THINGS AT HOME, OR GET ALONG WITH OTHER PEOPLE: NOT DIFFICULT AT ALL

## 2020-08-28 ASSESSMENT — PATIENT HEALTH QUESTIONNAIRE - PHQ9
5. POOR APPETITE OR OVEREATING: SEVERAL DAYS
SUM OF ALL RESPONSES TO PHQ QUESTIONS 1-9: 1

## 2020-08-28 ASSESSMENT — MIFFLIN-ST. JEOR: SCORE: 1338.47

## 2020-08-28 ASSESSMENT — PAIN SCALES - GENERAL: PAINLEVEL: NO PAIN (0)

## 2020-08-28 NOTE — PATIENT INSTRUCTIONS
Patient Education     Chronic Lung Disease: Preventing Lung Infections  There are many types of chronic lung disease. You may have one of these:    Chronic obstructive lung disease (COPD)    Chronic bronchitis    Emphysema    Pulmonary fibrosis    Sarcoidosis  When you have a chronic lung disease, it's important to protect yourself from respiratory infections. This includes colds, the flu, and lung infections such as pneumonia. Infections like these may cause your chronic lung disease to get worse. It's hard to fully avoid getting sick. But there are things you can do to lower your risk of infections.    Tips for preventing illness  You can lower your risk of respiratory infections with these steps:    Keep your hands clean. Wash your hands often. If you can t wash, use hand  that has alcohol. Use it after touching doorknobs, handles, keypads, and anything else other people have touched. Then wash your hands as soon as you can.    Wash well. When you wash your hands, use soap and warm water. Rub your hands together well for at least 20 seconds. Rinse them well. Dry your hands on clean towels or let them air-dry.    Don t touch your face. If your hands aren t clean, keep them away from your nose and mouth. Germs on your hands can get into your respiratory system this way.    Get a flu vaccine every year. To help prevent the flu, get a flu shot every year. You may be able to get it at your healthcare provider's office, a drugstore, pharmacy, or at work. Get your flu shot as soon as the vaccines are available in your area. This is often around September each year.    Get a pneumonia vaccine. The vaccine can help prevent pneumococcal pneumonia. Talk with your healthcare provider about which vaccine you need, the number of doses, and when you should have them.    Avoid sick people. Try to stay away from people with colds or the flu. Stay away from crowded places during cold and flu season. This may include  "shopping centers, movie theaters, and social events.    Quit smoking. If you smoke, talk with your healthcare provider about getting help to quit. Smoking can make your lung disease worse. And it increases your risk of infections. Also stay away from other people's smoke. This is called secondhand smoke. It is also harmful and increases your chance of infections.  Date Last Reviewed: 8/1/2018 2000-2019 [employer]. [address] [place] [address], [place] [address]. All rights reserved. This information is not intended as a substitute for professional medical care. Always follow your healthcare professional's instructions.    Patient Education     Contact Dermatitis  Contact dermatitis is a skin rash caused by something that touches the skin and makes it irritated and inflamed. Your skin may be red, swollen, dry, and may be cracked. Blisters may form and ooze. The rash will itch.  Contact dermatitis can form on the face and neck, backs of hands, forearms, genitals, and lower legs.  People can get contact dermatitis from lots of sources. These include:    Plants such as poison ivy, oak, or sumac    Chemicals in hair dyes and rinses, soaps, solvents, waxes, fingernail polish, and deodorants     Jewelry or watchbands made of nickel  Contact dermatitis is not passed from person to person.  Talk with your healthcare provider about what may have caused the rash. A type of allergy testing called \"patch testing\" may be used to discover what you are allergic to. You will need to avoid the source of your rash in the future to prevent it from coming back.  Treatment is done to relieve itching and prevent the rash from coming back. The rash should go away in a few days to a few weeks.  Home care  Your healthcare provider may prescribe medicine to relieve swelling and itching. Follow all instructions when using these medicines.  General care:    Avoid anything that heats up your skin, such as hot showers or baths, or " direct sunlight. This can make itching worse.    Apply cold compresses to soothe your sores to help relieve your symptoms. Do this for 30 minutes 3 to 4 times a day. You can make a cold compress by soaking a cloth in cold water. Squeeze out excess water. You can add colloidal oatmeal to the water to help reduce itching. For severe itching in a small area, apply an ice pack wrapped in a thin towel. Do this for 20 minutes 3 to 4 times a day.    You can also try wet dressings. One way to do this is to wear a wet piece of clothing under a dry one. Wear a damp shirt under a dry shirt if your upper body is affected. This can relieve itching and prevent you from scratching the affected area.    You can also help relieve large areas of itching by taking a lukewarm bath with colloidal oatmeal added to the water.    Use hydrocortisone cream for redness and irritation, unless another medicine was prescribed. You can also use benzocaine anesthetic cream or spray. Calamine lotion can also relieve mild symptoms.    Use oral diphenhydramine to help reduce itching. You can buy this antihistamine at drug and grocery stores. It can make you sleepy, so use lower doses during the daytime. Or you can use loratadine. This is an antihistamine that will not make you sleepy. Do not use diphenhydramine if you have glaucoma or have trouble urinating due to an enlarged prostate.    If a plant causes your rash, make sure to wash your skin and the clothes you were wearing when you came into contact with the plant. This is to wash away the plant oils that gave you the rash and prevent more or worse symptoms.    Stay away from the substance or object that causes your symptoms. If you can t avoid it, wear gloves or some other type of protection.  Follow-up care  Follow up with your healthcare provider, or as advised.  When to seek medical advice  Call your healthcare provider right away if any of these occur:    Spreading of the rash to other parts  of your body    Severe swelling of your face, eyelids, mouth, throat or tongue    Trouble urinating due to swelling in the genital area    Fever of 100.4 F (38 C) or higher    Redness or swelling that gets worse    Pain that gets worse    Foul-smelling fluid leaking from the skin    Yellow-brown crusts on the open blisters  Date Last Reviewed: 9/1/2016 2000-2019 [employer]. [address] [place] [address], [place] [id number]. All rights reserved. This information is not intended as a substitute for professional medical care. Always follow your healthcare professional's instructions.    Use Kenalog ointment as ordered. Do not use longer than 2 weeks.  Follow up in 1 months for fasting physical.

## 2020-08-28 NOTE — NURSING NOTE
"Chief Complaint   Patient presents with     COPD       Initial /82   Pulse 83   Temp 97.3  F (36.3  C)   Ht 1.654 m (5' 5.1\")   Wt 82.1 kg (181 lb)   SpO2 97%   BMI 30.03 kg/m   Estimated body mass index is 30.03 kg/m  as calculated from the following:    Height as of this encounter: 1.654 m (5' 5.1\").    Weight as of this encounter: 82.1 kg (181 lb).  Medication Reconciliation: complete  Radha Espino  "

## 2020-08-29 ASSESSMENT — ANXIETY QUESTIONNAIRES: GAD7 TOTAL SCORE: 2

## 2020-09-09 ENCOUNTER — TELEPHONE (OUTPATIENT)
Dept: FAMILY MEDICINE | Facility: OTHER | Age: 70
End: 2020-09-09

## 2020-09-09 DIAGNOSIS — J44.9 CHRONIC OBSTRUCTIVE PULMONARY DISEASE, UNSPECIFIED COPD TYPE (H): ICD-10-CM

## 2020-09-09 NOTE — TELEPHONE ENCOUNTER
Patient wondering if she should take the albuterol every 6 hours? Or just as needed? She was unsure if she should take that with her Advair disc. Patient just looking for clarification.     Thanks.    ROXI Garcia

## 2020-09-15 ENCOUNTER — TELEPHONE (OUTPATIENT)
Dept: FAMILY MEDICINE | Facility: OTHER | Age: 70
End: 2020-09-15

## 2020-09-15 NOTE — TELEPHONE ENCOUNTER
Called 9/15/20 patient to give [facility] phone number [phone] to call and schedule Pulmonary appt per referral. Rec'd a notice that [facility] couldn't get in touch with patient to schedule.  Brie Brmabila

## 2020-09-25 RX ORDER — INFLUENZA A VIRUS A/MICHIGAN/45/2015 X-275 (H1N1) ANTIGEN (FORMALDEHYDE INACTIVATED), INFLUENZA A VIRUS A/SINGAPORE/INFIMH-16-0019/2016 IVR-186 (H3N2) ANTIGEN (FORMALDEHYDE INACTIVATED), INFLUENZA B VIRUS B/PHUKET/3073/2013 ANTIGEN (FORMALDEHYDE INACTIVATED), AND INFLUENZA B VIRUS B/MARYLAND/15/2016 BX-69A ANTIGEN (FORMALDEHYDE INACTIVATED) 60; 60; 60; 60 UG/.7ML; UG/.7ML; UG/.7ML; UG/.7ML
INJECTION, SUSPENSION INTRAMUSCULAR
COMMUNITY
Start: 2020-09-08 | End: 2020-09-30

## 2020-09-25 NOTE — PROGRESS NOTES
SUBJECTIVE:      SUBJECTIVE:   CC: Ivonne Kirkland is an 70 year old woman who presents for preventive health visit.       Patient has been advised of split billing requirements and indicates understanding: Yes  Healthy Habits:    Do you get at least three servings of calcium containing foods daily (dairy, green leafy vegetables, etc.)? no, taking calcium and/or vitamin D supplement: states ever since she has had the advair disc it seems like she Is lactose . States she is getting tamra horses from drinking milk.     Amount of exercise or daily activities, outside of work: 7 day(s) per week. Walk after dinner .     Problems taking medications regularly No    Medication side effects: Yes thinks she is getting cramps/tamra horses from drinking regular milk ever since she started Advair disc since (4/2019)     Have you had an eye exam in the past two years? Yes- yesterday. Found out she has macular degeneration     Do you see a dentist twice per year? No- goes once a year     Do you have sleep apnea, excessive snoring or daytime drowsiness?no    She would like Lutein and Zeaxanthin ordered for her eyes. She has macular degeneration. The ocuvite makes her sick.     She has a bulge in left upper leg vein that will worsen when she walks.     Today's PHQ-2 Score:   PHQ-2 ( 1999 Pfizer) 8/4/2015 5/6/2014   Q1: Little interest or pleasure in doing things 0 0   Q2: Feeling down, depressed or hopeless 0 0   PHQ-2 Score 0 0       Abuse: Current or Past(Physical, Sexual or Emotional)- No  Do you feel safe in your environment? Yes    Have you ever done Advance Care Planning? (For example, a Health Directive, POLST, or a discussion with a medical provider or your loved ones about your wishes): No, advance care planning information given to patient to review.  Patient declined advance care planning discussion at this time.    Social History     Tobacco Use     Smoking status: Former Smoker     Packs/day: 0.50     Types:  Cigarettes     Last attempt to quit: 2019     Years since quittin.4     Smokeless tobacco: Never Used   Substance Use Topics     Alcohol use: Yes     Alcohol/week: 0.0 standard drinks     Comment: occassional     If you drink alcohol do you typically have >3 drinks per day or >7 drinks per week? Yes - AUDIT SCORE:    more than 7 or week. A beer or so before eats dinner. (nia ultra )   No flowsheet data found.                     Reviewed orders with patient.  Reviewed health maintenance and updated orders accordingly - Yes  There is no problem list on file for this patient.    Past Surgical History:   Procedure Laterality Date     APPENDECTOMY       CHOLECYSTECTOMY       GYN SURGERY      hysterectomy     ORTHOPEDIC SURGERY      right wrist fracture     ORTHOPEDIC SURGERY      left elbow cyst     SOFT TISSUE SURGERY      I and D cysts on back and chest       Social History     Tobacco Use     Smoking status: Former Smoker     Packs/day: 0.50     Types: Cigarettes     Last attempt to quit: 2019     Years since quittin.4     Smokeless tobacco: Never Used   Substance Use Topics     Alcohol use: Yes     Alcohol/week: 0.0 standard drinks     Comment: occassional     Family History   Problem Relation Age of Onset     Diabetes Mother      Pancreatic Cancer Mother 63     Diabetes Maternal Grandfather      Lung Cancer Father 59     Abdominal Aortic Aneurysm Father      Diabetes Sister      Heart Failure Sister      Diabetes Brother      Diabetes Brother      Hypertension No family hx of      Hyperlipidemia No family hx of      Asthma No family hx of      Thyroid Disease No family hx of          Current Outpatient Medications   Medication Sig Dispense Refill     ADVAIR DISKUS 250-50 MCG/DOSE inhaler Inhale 1 puff into the lungs 2 times daily       albuterol (PROAIR HFA/PROVENTIL HFA/VENTOLIN HFA) 108 (90 Base) MCG/ACT inhaler Inhale 2 puffs into the lungs every 6 hours 18 g 0     ASPIRIN PO Take 81 mg by  mouth daily.       Lutein-Zeaxanthin 6-1 MG TABS Take 2 tablets by mouth daily 180 tablet 3     Allergies   Allergen Reactions     Cefuroxime Other (See Comments) and Nausea     dizziness     Clindamycin Hcl Diarrhea     Codeine Sulfate Other (See Comments)     Passed out     Morphine Sulfate Nausea and Vomiting       Mammogram Screening: Patient over age 50, mutual decision to screen reflected in health maintenance.    Pertinent mammograms are reviewed under the imaging tab.  History of abnormal Pap smear: NO - age 65 - see link Cervical Cytology Screening Guidelines     Reviewed and updated as needed this visit by clinical staff  Tobacco  Allergies  Med Hx  Surg Hx  Fam Hx  Soc Hx        Reviewed and updated as needed this visit by Provider        Past Medical History:   Diagnosis Date     COPD (chronic obstructive pulmonary disease) (H)     4-      Past Surgical History:   Procedure Laterality Date     APPENDECTOMY       CHOLECYSTECTOMY       GYN SURGERY      hysterectomy     ORTHOPEDIC SURGERY      right wrist fracture     ORTHOPEDIC SURGERY      left elbow cyst     SOFT TISSUE SURGERY      I and D cysts on back and chest       ROS:  CONSTITUTIONAL: NEGATIVE for fever, chills, change in weight  INTEGUMENTARY/SKIN: NEGATIVE for worrisome rashes, moles or lesions  EYES: NEGATIVE for vision changes or irritation  ENT: NEGATIVE for ear, mouth and throat problems  RESP: NEGATIVE for significant cough or SOB  BREAST: NEGATIVE for masses, tenderness or discharge  CV: NEGATIVE for chest pain, palpitations or peripheral edema  GI: NEGATIVE for nausea, abdominal pain, heartburn, or change in bowel habits  : NEGATIVE for unusual urinary or vaginal symptoms. No vaginal bleeding.  MUSCULOSKELETAL: NEGATIVE for significant arthralgias or myalgia  NEURO: NEGATIVE for weakness, dizziness or paresthesias  PSYCHIATRIC: NEGATIVE for changes in mood or affect     OBJECTIVE:   BP (!) 170/102 (BP Location: Right arm,  Patient Position: Sitting, Cuff Size: Adult Regular)   Pulse 68   Temp 97.9  F (36.6  C)   Wt 82.5 kg (181 lb 14.4 oz)   SpO2 98%   BMI 30.18 kg/m     Repeat /90    EXAM:  GENERAL: healthy, alert and no distress  EYES: Eyes grossly normal to inspection, PERRL and conjunctivae and sclerae normal  HENT: ear canals and TM's normal, nose and mouth without ulcers or lesions  NECK: no adenopathy, no asymmetry, masses, or scars and thyroid normal to palpation  RESP: lungs clear to auscultation - no rales, rhonchi or wheezes  BREAST: normal without masses, tenderness or nipple discharge and no palpable axillary masses or adenopathy  CV: regular rate and rhythm, normal S1 S2, no S3 or S4, no murmur, click or rub, no peripheral edema and peripheral pulses strong  ABDOMEN: soft, nontender, no hepatosplenomegaly, no masses and bowel sounds normal   (female): normal female external genitalia, normal urethral meatus, vaginal mucosa pink, moist, well rugated, and without masses or discharge. +fatty deposits on distal labia bilaterally   MS: no gross musculoskeletal defects noted, no edema. +varicose vein in left upper thigh  SKIN: no suspicious lesions or rashes  NEURO: Normal strength and tone, mentation intact and speech normal  PSYCH: mentation appears normal, affect normal/bright    Diagnostic Test Results:  Labs reviewed in Epic  Results for orders placed or performed in visit on 09/30/20 (from the past 24 hour(s))   Lipid Profile (Chol, Trig, HDL, LDL calc)   Result Value Ref Range    Cholesterol 213 (H) <200 mg/dL    Triglycerides 88 <150 mg/dL    HDL Cholesterol 74 >49 mg/dL    LDL Cholesterol Calculated 121 (H) <100 mg/dL    Non HDL Cholesterol 139 (H) <130 mg/dL   CBC with platelets   Result Value Ref Range    WBC 6.4 4.0 - 11.0 10e9/L    RBC Count 4.37 3.8 - 5.2 10e12/L    Hemoglobin 14.0 11.7 - 15.7 g/dL    Hematocrit 40.4 35.0 - 47.0 %    MCV 92 78 - 100 fl    MCH 32.0 26.5 - 33.0 pg    MCHC 34.7 31.5 -  36.5 g/dL    RDW 13.0 10.0 - 15.0 %    Platelet Count 237 150 - 450 10e9/L   Comprehensive metabolic panel   Result Value Ref Range    Sodium 136 133 - 144 mmol/L    Potassium 4.5 3.4 - 5.3 mmol/L    Chloride 101 94 - 109 mmol/L    Carbon Dioxide 27 20 - 32 mmol/L    Anion Gap 8 3 - 14 mmol/L    Glucose 107 (H) 70 - 99 mg/dL    Urea Nitrogen 14 7 - 30 mg/dL    Creatinine 0.86 0.52 - 1.04 mg/dL    GFR Estimate 67 >60 mL/min/[1.73_m2]    GFR Estimate If Black 78 >60 mL/min/[1.73_m2]    Calcium 8.9 8.5 - 10.1 mg/dL    Bilirubin Total 0.5 0.2 - 1.3 mg/dL    Albumin 4.0 3.4 - 5.0 g/dL    Protein Total 7.4 6.8 - 8.8 g/dL    Alkaline Phosphatase 113 40 - 150 U/L    ALT 25 0 - 50 U/L    AST 19 0 - 45 U/L   TSH with free T4 reflex   Result Value Ref Range    TSH 1.47 0.40 - 4.00 mU/L   *UA reflex to Microscopic and Culture - [facility]    Specimen: Midstream Urine   Result Value Ref Range    Color Urine Yellow     Appearance Urine Clear     Glucose Urine Negative NEG^Negative mg/dL    Bilirubin Urine Negative NEG^Negative    Ketones Urine Negative NEG^Negative mg/dL    Specific Gravity Urine 1.015 1.003 - 1.035    Blood Urine Moderate (A) NEG^Negative    pH Urine 5.5 5.0 - 7.0 pH    Protein Albumin Urine Negative NEG^Negative mg/dL    Urobilinogen Urine 0.2 0.2 - 1.0 EU/dL    Nitrite Urine Negative NEG^Negative    Leukocyte Esterase Urine Negative NEG^Negative    Source Midstream Urine    Urine Microscopic   Result Value Ref Range    WBC Urine NONE SEEN OTO5^0 - 5 /HPF    RBC Urine O - 2 OTO2^O - 2 /HPF    Bacteria Urine Few (A) NEG^Negative /HPF       ASSESSMENT/PLAN:     Cholesterol elevated-recommended starting on a statin. Positive cologuard in May 2019 from [facility] without follow up. Strongly recommended a colonoscopy. Blood continues in urine. Strongly recommended follow up with urology as she had no other urinary symptoms.     Varicose vein in left upper thigh. She deferred seeing a surgeon  "for management at this time.     (Z00.00) Routine general medical examination at a health care facility  (primary encounter diagnosis)  Comment: normal exam. Check labs and update health maintenance   Plan: Lipid Profile (Chol, Trig, HDL, LDL calc), CBC         with platelets, Comprehensive metabolic panel,         *UA reflex to Microscopic and Culture - [place]         [facility], Hepatitis C Screen Reflex to HCV        RNA Quant and Genotype, General PFT Lab (Please        always keep checked), Pulmonary Function Test,         TSH with free T4 reflex, US Aorta Medicare AAA         Screening, Urine Microscopic            (Z78.0) Post-menopausal  Comment: due for dexa. ordered   Plan: DX Hip/Pelvis/Spine            (J44.9) Chronic obstructive pulmonary disease, unspecified COPD type (H)  Comment: she's never had PFT. PFT ordered  Plan: General PFT Lab (Please always keep checked),         Pulmonary Function Test            (H35.30) Macular degeneration (senile) of retina  Comment: ordered lutein and zeaxanthin for eye health  Plan: Lutein-Zeaxanthin 6-1 MG TABS            (Z12.31) Encounter for screening mammogram for breast cancer  Comment: due for screening mammogram  Plan: MA SCREENING DIGITAL BILATERAL ([place])              Patient has been advised of split billing requirements and indicates understanding: Yes  COUNSELING:   Reviewed preventive health counseling, as reflected in patient instructions       Regular exercise       Healthy diet/nutrition       Vision screening       Osteoporosis Prevention/Bone Health       Colon cancer screening       Consider lung cancer screening for ages 55-80 years and 30 pack-year smoking history-she will think about it. Discussed that I recommend a low dose chest CT. She was a heavy cigarette smoker. She smoked 0.5-1 PPD for 42+ years.     Estimated body mass index is 30.18 kg/m  as calculated from the following:    Height as of 8/28/20: 1.654 m (5' 5.1\").    Weight as of this " encounter: 82.5 kg (181 lb 14.4 oz).      She reports that she quit smoking about 17 months ago. Her smoking use included cigarettes. She smoked 0.50 packs per day. She has never used smokeless tobacco.      Counseling Resources:  ATP IV Guidelines  Pooled Cohorts Equation Calculator  Breast Cancer Risk Calculator  BRCA-Related Cancer Risk Assessment: FHS-7 Tool  FRAX Risk Assessment  ICSI Preventive Guidelines  Dietary Guidelines for Americans, 2010  USDA's MyPlate  ASA Prophylaxis  Lung CA Screening    Bela Sandhu NP  [facility]

## 2020-09-25 NOTE — PATIENT INSTRUCTIONS
Preventive Health Recommendations    See your health care provider every year to    Review health changes.     Discuss preventive care.      Review your medicines if your doctor has prescribed any.      You no longer need a yearly Pap test unless you've had an abnormal Pap test in the past 10 years. If you have vaginal symptoms, such as bleeding or discharge, be sure to talk with your provider about a Pap test.      Every 1 to 2 years, have a mammogram.  If you are over 69, talk with your health care provider about whether or not you want to continue having screening mammograms.      Every 10 years, have a colonoscopy. Or, have a yearly FIT test (stool test). These exams will check for colon cancer.       Have a cholesterol test every 5 years, or more often if your doctor advises it.       Have a diabetes test (fasting glucose) every three years. If you are at risk for diabetes, you should have this test more often.       At age 65, have a bone density scan (DEXA) to check for osteoporosis (brittle bone disease).    Shots:    Get a flu shot each year.    Get a tetanus shot every 10 years.    Talk to your doctor about your pneumonia vaccines. There are now two you should receive - Pneumovax (PPSV 23) and Prevnar (PCV 13).    Talk to your pharmacist about the shingles vaccine.    Talk to your doctor about the hepatitis B vaccine.    Nutrition:     Eat at least 5 servings of fruits and vegetables each day.      Eat whole-grain bread, whole-wheat pasta and brown rice instead of white grains and rice.      Get adequate about Calcium and Vitamin D.     Lifestyle    Exercise at least 150 minutes a week (30 minutes a day, 5 days a week). This will help you control your weight and prevent disease.      Limit alcohol to one drink per day.      No smoking.       Wear sunscreen to prevent skin cancer.       See your dentist twice a year for an exam and cleaning.      See your eye doctor every 1 to 2 years to screen for  conditions such as glaucoma, macular degeneration, cataracts, etc.    Personalized Prevention Plan  You are due for the preventive services outlined below.  Your care team is available to assist you in scheduling these services.  If you have already completed any of these items, please share that information with your care team to update in your medical record.    Health Maintenance Due   Topic Date Due     Osteoporosis Screening  1950     Breathing Capacity Test  1950     Hepatitis C Screening  1950     Discuss Advance Care Planning  1950     COPD Action Plan  1950     Colorectal Cancer Screening  04/13/1959     Cholesterol Lab  04/13/1994     Annual Wellness Visit  04/13/2014     Pneumococcal Vaccine (2 of 2 - PPSV23) 04/25/2020     Preventive Health Recommendations    See your health care provider every year to    Review health changes.     Discuss preventive care.      Review your medicines if your doctor has prescribed any.      You no longer need a yearly Pap test unless you've had an abnormal Pap test in the past 10 years. If you have vaginal symptoms, such as bleeding or discharge, be sure to talk with your provider about a Pap test.      Every 1 to 2 years, have a mammogram.  If you are over 69, talk with your health care provider about whether or not you want to continue having screening mammograms.      Every 10 years, have a colonoscopy. Or, have a yearly FIT test (stool test). These exams will check for colon cancer.       Have a cholesterol test every 5 years, or more often if your doctor advises it.       Have a diabetes test (fasting glucose) every three years. If you are at risk for diabetes, you should have this test more often.       At age 65, have a bone density scan (DEXA) to check for osteoporosis (brittle bone disease).    Shots:    Get a flu shot each year.    Get a tetanus shot every 10 years.    Talk to your doctor about your pneumonia vaccines. There are now  two you should receive - Pneumovax (PPSV 23) and Prevnar (PCV 13).    Talk to your pharmacist about the shingles vaccine.    Talk to your doctor about the hepatitis B vaccine.    Nutrition:     Eat at least 5 servings of fruits and vegetables each day.      Eat whole-grain bread, whole-wheat pasta and brown rice instead of white grains and rice.      Get adequate about Calcium and Vitamin D.     Lifestyle    Exercise at least 150 minutes a week (30 minutes a day, 5 days a week). This will help you control your weight and prevent disease.      Limit alcohol to one drink per day.      No smoking.       Wear sunscreen to prevent skin cancer.       See your dentist twice a year for an exam and cleaning.      See your eye doctor every 1 to 2 years to screen for conditions such as glaucoma, macular degeneration, cataracts, etc.    Personalized Prevention Plan  You are due for the preventive services outlined below.  Your care team is available to assist you in scheduling these services.  If you have already completed any of these items, please share that information with your care team to update in your medical record.    Health Maintenance Due   Topic Date Due     Osteoporosis Screening  1950     Breathing Capacity Test  1950     Hepatitis C Screening  1950     Discuss Advance Care Planning  1950     COPD Action Plan  1950     Colorectal Cancer Screening  04/13/1959     Cholesterol Lab  04/13/1994     Pneumococcal Vaccine (2 of 2 - PPSV23) 04/25/2020

## 2020-09-30 ENCOUNTER — OFFICE VISIT (OUTPATIENT)
Dept: FAMILY MEDICINE | Facility: OTHER | Age: 70
End: 2020-09-30
Attending: NURSE PRACTITIONER
Payer: MEDICARE

## 2020-09-30 VITALS
SYSTOLIC BLOOD PRESSURE: 152 MMHG | TEMPERATURE: 97.9 F | OXYGEN SATURATION: 98 % | HEART RATE: 68 BPM | DIASTOLIC BLOOD PRESSURE: 90 MMHG | WEIGHT: 181.9 LBS | BODY MASS INDEX: 30.18 KG/M2

## 2020-09-30 DIAGNOSIS — E78.2 MIXED HYPERLIPIDEMIA: Primary | ICD-10-CM

## 2020-09-30 DIAGNOSIS — Z78.0 POST-MENOPAUSAL: ICD-10-CM

## 2020-09-30 DIAGNOSIS — Z12.31 ENCOUNTER FOR SCREENING MAMMOGRAM FOR BREAST CANCER: ICD-10-CM

## 2020-09-30 DIAGNOSIS — J44.9 CHRONIC OBSTRUCTIVE PULMONARY DISEASE, UNSPECIFIED COPD TYPE (H): ICD-10-CM

## 2020-09-30 DIAGNOSIS — Z00.00 ROUTINE GENERAL MEDICAL EXAMINATION AT A HEALTH CARE FACILITY: Primary | ICD-10-CM

## 2020-09-30 DIAGNOSIS — H35.30 MACULAR DEGENERATION (SENILE) OF RETINA: ICD-10-CM

## 2020-09-30 LAB
ALBUMIN SERPL-MCNC: 4 G/DL (ref 3.4–5)
ALBUMIN UR-MCNC: NEGATIVE MG/DL
ALP SERPL-CCNC: 113 U/L (ref 40–150)
ALT SERPL W P-5'-P-CCNC: 25 U/L (ref 0–50)
ANION GAP SERPL CALCULATED.3IONS-SCNC: 8 MMOL/L (ref 3–14)
APPEARANCE UR: CLEAR
AST SERPL W P-5'-P-CCNC: 19 U/L (ref 0–45)
BACTERIA #/AREA URNS HPF: ABNORMAL /HPF
BILIRUB SERPL-MCNC: 0.5 MG/DL (ref 0.2–1.3)
BILIRUB UR QL STRIP: NEGATIVE
BUN SERPL-MCNC: 14 MG/DL (ref 7–30)
CALCIUM SERPL-MCNC: 8.9 MG/DL (ref 8.5–10.1)
CHLORIDE SERPL-SCNC: 101 MMOL/L (ref 94–109)
CHOLEST SERPL-MCNC: 213 MG/DL
CO2 SERPL-SCNC: 27 MMOL/L (ref 20–32)
COLOR UR AUTO: YELLOW
CREAT SERPL-MCNC: 0.86 MG/DL (ref 0.52–1.04)
ERYTHROCYTE [DISTWIDTH] IN BLOOD BY AUTOMATED COUNT: 13 % (ref 10–15)
GFR SERPL CREATININE-BSD FRML MDRD: 67 ML/MIN/{1.73_M2}
GLUCOSE SERPL-MCNC: 107 MG/DL (ref 70–99)
GLUCOSE UR STRIP-MCNC: NEGATIVE MG/DL
HCT VFR BLD AUTO: 40.4 % (ref 35–47)
HDLC SERPL-MCNC: 74 MG/DL
HGB BLD-MCNC: 14 G/DL (ref 11.7–15.7)
HGB UR QL STRIP: ABNORMAL
KETONES UR STRIP-MCNC: NEGATIVE MG/DL
LDLC SERPL CALC-MCNC: 121 MG/DL
LEUKOCYTE ESTERASE UR QL STRIP: NEGATIVE
MCH RBC QN AUTO: 32 PG (ref 26.5–33)
MCHC RBC AUTO-ENTMCNC: 34.7 G/DL (ref 31.5–36.5)
MCV RBC AUTO: 92 FL (ref 78–100)
NITRATE UR QL: NEGATIVE
NONHDLC SERPL-MCNC: 139 MG/DL
PH UR STRIP: 5.5 PH (ref 5–7)
PLATELET # BLD AUTO: 237 10E9/L (ref 150–450)
POTASSIUM SERPL-SCNC: 4.5 MMOL/L (ref 3.4–5.3)
PROT SERPL-MCNC: 7.4 G/DL (ref 6.8–8.8)
RBC # BLD AUTO: 4.37 10E12/L (ref 3.8–5.2)
RBC #/AREA URNS AUTO: ABNORMAL /HPF
SODIUM SERPL-SCNC: 136 MMOL/L (ref 133–144)
SOURCE: ABNORMAL
SP GR UR STRIP: 1.01 (ref 1–1.03)
TRIGL SERPL-MCNC: 88 MG/DL
TSH SERPL DL<=0.005 MIU/L-ACNC: 1.47 MU/L (ref 0.4–4)
UROBILINOGEN UR STRIP-ACNC: 0.2 EU/DL (ref 0.2–1)
WBC # BLD AUTO: 6.4 10E9/L (ref 4–11)
WBC #/AREA URNS AUTO: ABNORMAL /HPF

## 2020-09-30 PROCEDURE — 84443 ASSAY THYROID STIM HORMONE: CPT | Mod: ZL,GZ | Performed by: NURSE PRACTITIONER

## 2020-09-30 PROCEDURE — 85027 COMPLETE CBC AUTOMATED: CPT | Mod: ZL | Performed by: NURSE PRACTITIONER

## 2020-09-30 PROCEDURE — G0472 HEP C SCREEN HIGH RISK/OTHER: HCPCS | Mod: ZL | Performed by: NURSE PRACTITIONER

## 2020-09-30 PROCEDURE — 80061 LIPID PANEL: CPT | Mod: ZL | Performed by: NURSE PRACTITIONER

## 2020-09-30 PROCEDURE — 81001 URINALYSIS AUTO W/SCOPE: CPT | Mod: ZL | Performed by: NURSE PRACTITIONER

## 2020-09-30 PROCEDURE — 36415 COLL VENOUS BLD VENIPUNCTURE: CPT | Mod: ZL | Performed by: NURSE PRACTITIONER

## 2020-09-30 PROCEDURE — 80053 COMPREHEN METABOLIC PANEL: CPT | Mod: ZL | Performed by: NURSE PRACTITIONER

## 2020-09-30 PROCEDURE — 99397 PER PM REEVAL EST PAT 65+ YR: CPT | Performed by: NURSE PRACTITIONER

## 2020-09-30 RX ORDER — SIMVASTATIN 20 MG
20 TABLET ORAL AT BEDTIME
Qty: 90 TABLET | Refills: 1 | Status: SHIPPED | OUTPATIENT
Start: 2020-09-30 | End: 2020-10-14 | Stop reason: SINTOL

## 2020-09-30 ASSESSMENT — PAIN SCALES - GENERAL: PAINLEVEL: NO PAIN (0)

## 2020-09-30 NOTE — NURSING NOTE
"Chief Complaint   Patient presents with     Physical       Initial BP (!) 170/102 (BP Location: Right arm, Patient Position: Sitting, Cuff Size: Adult Regular)   Pulse 68   Temp 97.9  F (36.6  C)   Wt 82.5 kg (181 lb 14.4 oz)   SpO2 98%   BMI 30.18 kg/m   Estimated body mass index is 30.18 kg/m  as calculated from the following:    Height as of 8/28/20: 1.654 m (5' 5.1\").    Weight as of this encounter: 82.5 kg (181 lb 14.4 oz).  Medication Reconciliation: complete  Radha Espino  "

## 2020-10-02 LAB — HCV AB SERPL QL IA: NONREACTIVE

## 2020-10-13 NOTE — PROGRESS NOTES
Subjective     Ivonne Kirkland is a 70 year old female who presents to clinic today for the following health issues:    HPI       Hypertension Follow-up      Patient does not check bp at home. Also has only been taking her simvastatin about every 4 days. States after one dose it gave her leg (muscle cramps)     She doesn't add extra salt to food    She's trying to be more active, but it is hard since her  retired    She is inquiring about having a steroid and ABX on hand for COPD flare up over the winter.      Review of Systems   Constitutional, HEENT, cardiovascular, pulmonary, gi and gu systems are negative, except as otherwise noted.      Objective    BP (!) 148/98 (BP Location: Right arm, Patient Position: Sitting, Cuff Size: Adult Regular)   Pulse 77   Temp 98.1  F (36.7  C)   Wt 82.6 kg (182 lb)   SpO2 98%   BMI 30.19 kg/m    Body mass index is 30.19 kg/m . Recheck /88 (right arm)  Physical Exam   GENERAL: healthy, alert and no distress  NECK: no adenopathy, no asymmetry, masses, or scars and thyroid normal to palpation  RESP: lungs clear to auscultation - no rales, rhonchi or wheezes  CV: regular rate and rhythm, normal S1 S2, no S3 or S4, no murmur, click or rub, no peripheral edema and peripheral pulses strong  ABDOMEN: soft, nontender, no hepatosplenomegaly, no masses and bowel sounds normal  MS: no gross musculoskeletal defects noted, no edema  SKIN: no suspicious lesions or rashes  NEURO: Normal strength and tone, mentation intact and speech normal  PSYCH: mentation appears normal, affect normal/bright      Assessment & Plan     (J44.9) Chronic obstructive pulmonary disease, unspecified COPD type (H)  (primary encounter diagnosis)  Comment: ABX and steroid on hand for winter. Faxed to RX   Plan: predniSONE (DELTASONE) 20 MG tablet,         azithromycin (ZITHROMAX Z-SHANDA) 250 MG tablet            (I10) Essential hypertension  Comment: start on hydrochlorothiazide. Life style modifications  educated on  Plan: hydrochlorothiazide (HYDRODIURIL) 25 MG tablet           See Patient Instructions    Return in about 1 month (around 11/14/2020) for Medication and BP check.    Bela Sandhu NP  [facility] [place]

## 2020-10-14 ENCOUNTER — OFFICE VISIT (OUTPATIENT)
Dept: FAMILY MEDICINE | Facility: OTHER | Age: 70
End: 2020-10-14
Attending: NURSE PRACTITIONER
Payer: MEDICARE

## 2020-10-14 VITALS
WEIGHT: 182 LBS | HEART RATE: 77 BPM | TEMPERATURE: 98.1 F | DIASTOLIC BLOOD PRESSURE: 98 MMHG | BODY MASS INDEX: 30.19 KG/M2 | SYSTOLIC BLOOD PRESSURE: 148 MMHG | OXYGEN SATURATION: 98 %

## 2020-10-14 DIAGNOSIS — J44.9 CHRONIC OBSTRUCTIVE PULMONARY DISEASE, UNSPECIFIED COPD TYPE (H): Primary | ICD-10-CM

## 2020-10-14 DIAGNOSIS — I10 ESSENTIAL HYPERTENSION: ICD-10-CM

## 2020-10-14 PROCEDURE — G0463 HOSPITAL OUTPT CLINIC VISIT: HCPCS

## 2020-10-14 PROCEDURE — 99213 OFFICE O/P EST LOW 20 MIN: CPT | Performed by: NURSE PRACTITIONER

## 2020-10-14 RX ORDER — AZITHROMYCIN 250 MG/1
TABLET, FILM COATED ORAL
Qty: 6 TABLET | Refills: 1 | Status: SHIPPED | OUTPATIENT
Start: 2020-10-14 | End: 2020-11-13

## 2020-10-14 RX ORDER — HYDROCHLOROTHIAZIDE 25 MG/1
25 TABLET ORAL DAILY
Qty: 30 TABLET | Refills: 1 | Status: SHIPPED | OUTPATIENT
Start: 2020-10-14 | End: 2020-11-13

## 2020-10-14 RX ORDER — PREDNISONE 20 MG/1
TABLET ORAL
Qty: 10 TABLET | Refills: 1 | Status: ON HOLD | OUTPATIENT
Start: 2020-10-14 | End: 2021-02-16

## 2020-10-14 ASSESSMENT — PAIN SCALES - GENERAL: PAINLEVEL: NO PAIN (0)

## 2020-10-14 NOTE — PATIENT INSTRUCTIONS
Patient Education     Tips for a Low-Sodium Diet  If you have high blood pressure, heart failure, liver problems or kidney problems, you may need to watch your sodium intake. Too much sodium can cause thirst and shortness of breath. It can also make your body retain extra fluids.  You should limit the amount of sodium in your diet to mg per day.  Sodium is found in many foods. Most of our sodium comes from  processed  foods like canned soups, lunch meats and TV dinners.  A major source of sodium is salt, or sodium chloride. Salt is often used to preserve foods (extend their shelf life). We have gotten used to the taste of salt in our foods. When you start to limit your salt intake, you will notice the lack of salt. Give yourself time to adjust to the change.  Tips    To keep track of your sodium intake, write down the amount of sodium you eat for a of couple days. This gives you a good idea of which foods are high in sodium--and where you can cut back.    Do not add salt while cooking or at the table. In recipes, you can often use half the amount of salt without giving up flavor.    Do not add salt to water when making rice, pasta or potatoes.    Do not use lemon pepper--this is made with salt.    Use spices and herbs without the word  salt  in their names. Use herb blends like Mrs. Youngblood.    When eating vegetables, meats, poultry or fish, choose fresh or frozen instead of canned foods.    Eat more homemade foods that are made from scratch. Avoid boxed rice, noodles or potato dishes--these often contain salty seasonings.    Choose foods with the least amount of packaging. These are often lower in sodium .    Read food labels to learn the sodium content for suggested serving sizes. Choose foods with the least amount of sodium.  ? Choose foods labeled  low sodium.  These have less than 140 mg of sodium per serving.    Always double-check serving sizes. If you eat two servings of a food, you will get twice as much  sodium.    When you go out to eat, ask that foods be made without added salt. Ask for condiments on the side, so you can control how much you use.    You will find foods with less sodium if you shop along the outer walls of the grocery store.    Do not use a salt substitute without your doctor s okay. Some products (like [place] Lite Salt) contain potassium. If you are taking certain medicines, these products could raise the level of potassium in your blood.  High-sodium foods    Salt or kosher salt (one teaspoon = 2,300 mg of sodium)    Seasonings (lemon pepper, garlic salt, sea salt, seasoning salt, etc.)    Meat tenderizers and marinades    Packaged sauces or gravies    Snack foods (chips, crackers, pork rinds, salted nuts)    Cheese (natural and processed)    Cottage cheese    Fast-food and restaurant meals    Most canned vegetables and vegetable juices    Pickled and cured foods (pickles, olives, sauerkraut)    Condiments (BBQ sauce, soy sauce, teriyaki sauce, steak sauce, salad dressing, ketchup, etc.)    Canned or boxed side dishes, such as macaroni and cheese, ramen noodles, Hamburger [place] and Rice-[employer]-Isaías    Frozen meals with more than 500 mg of sodium per serving    Monosodium glutamate (MSG)    Processed meats (bologna, ham, mcnamara) and canned meats (SPAM, corned beef)    Soups and bouillon (canned, frozen or dried)    Canned tomato products (juice, spaghetti sauce, etc.)    Sports drinks such as Gatorade or Powerade    Foods covered in sauce, gravy or other coatings (broccoli with cheese sauce, chicken fingers, etc.)    Biscuits and refrigerated rolls or breads      Start Hydrochlorothiazide daily. Take in the morning.   Start krill oil per package.   Decrease sodium in diet.   Do not add sodium into diet.   Follow up in 1 month for BP check.

## 2020-11-06 ENCOUNTER — HOSPITAL ENCOUNTER (OUTPATIENT)
Dept: ULTRASOUND IMAGING | Facility: HOSPITAL | Age: 70
End: 2020-11-06
Attending: NURSE PRACTITIONER
Payer: MEDICARE

## 2020-11-06 ENCOUNTER — HOSPITAL ENCOUNTER (OUTPATIENT)
Dept: BONE DENSITY | Facility: HOSPITAL | Age: 70
End: 2020-11-06
Attending: NURSE PRACTITIONER
Payer: MEDICARE

## 2020-11-06 ENCOUNTER — ANCILLARY PROCEDURE (OUTPATIENT)
Dept: MAMMOGRAPHY | Facility: OTHER | Age: 70
End: 2020-11-06
Attending: NURSE PRACTITIONER
Payer: MEDICARE

## 2020-11-06 DIAGNOSIS — Z12.31 ENCOUNTER FOR SCREENING MAMMOGRAM FOR BREAST CANCER: ICD-10-CM

## 2020-11-06 DIAGNOSIS — Z78.0 POST-MENOPAUSAL: ICD-10-CM

## 2020-11-06 DIAGNOSIS — Z00.00 ROUTINE GENERAL MEDICAL EXAMINATION AT A HEALTH CARE FACILITY: ICD-10-CM

## 2020-11-06 PROCEDURE — 76706 US ABDL AORTA SCREEN AAA: CPT

## 2020-11-06 PROCEDURE — 77067 SCR MAMMO BI INCL CAD: CPT | Mod: TC

## 2020-11-06 PROCEDURE — 77080 DXA BONE DENSITY AXIAL: CPT

## 2020-11-10 PROBLEM — J44.9 CHRONIC OBSTRUCTIVE PULMONARY DISEASE (H): Chronic | Status: ACTIVE | Noted: 2020-11-10

## 2020-11-10 RX ORDER — SIMVASTATIN 20 MG
20 TABLET ORAL AT BEDTIME
COMMUNITY
Start: 2020-10-01 | End: 2020-11-13

## 2020-11-10 NOTE — PROGRESS NOTES
Subjective     Ivonne Kirkland is a 70 year old female who presents to clinic today for the following health issues:    HPI       Hypertension Follow-up      Do you check your blood pressure regularly outside of the clinic? No , randomly     Are you following a low salt diet? No, she still eats salty foods, but does not add extra salt.     Are your blood pressures ever more than 140 on the top number (systolic) OR more   than 90 on the bottom number (diastolic), for example 140/90? Yes     She brought in her home BP cuff today that she has been using.     Manual clinic BP cuff 144/80, right arm  Ivonne's home cuff 142/70, right arm    She had a incidental finding of a RUQ mass on [facility] screening ultrasound with tobacco use history. A CT scan was recommended per radiologist. She decided she will do a CT scan for follow up.     She cancelled PFT testing until COVID dissipates. She doesn't want to take her mask off for testing with COPD history.       Review of Systems   Constitutional, HEENT, cardiovascular, pulmonary, gi and gu systems are negative, except as otherwise noted.      Objective    BP (!) 144/68   Pulse 87   Temp 97.9  F (36.6  C)   SpO2 97%   There is no height or weight on file to calculate BMI.  Physical Exam   GENERAL: healthy, alert and no distress  NECK: no adenopathy, no asymmetry, masses, or scars and thyroid normal to palpation  RESP: lungs clear to auscultation - no rales, rhonchi or wheezes  CV: regular rate and rhythm, normal S1 S2, no S3 or S4, no murmur, click or rub, no peripheral edema and peripheral pulses strong  MS: no gross musculoskeletal defects noted, no edema  SKIN: no suspicious lesions or rashes  PSYCH: mentation appears normal, affect normal/bright    BMP pending    Assessment & Plan     (I10) Essential hypertension  (primary encounter diagnosis)  Comment: continue HCTZ  Plan: Basic metabolic panel, hydrochlorothiazide         (HYDRODIURIL) 25 MG tablet              (R19.01)  RUQ abdominal mass  Comment: RUQ mass evaluation  Plan: CT Abdomen Pelvis w Contrast            (Z23) Need for vaccination  Comment: vaccine administration today  Plan: Pneumococcal vaccine 23 valent PPSV23          (Pneumovax) [22968], ADMIN MEDICARE:         Pneumococcal Vaccine ()              See Patient Instructions    Return in about 2 months (around 1/13/2021), or BP check.    Bela Sandhu NP  [facility]

## 2020-11-13 ENCOUNTER — OFFICE VISIT (OUTPATIENT)
Dept: FAMILY MEDICINE | Facility: OTHER | Age: 70
End: 2020-11-13
Attending: NURSE PRACTITIONER
Payer: MEDICARE

## 2020-11-13 VITALS
OXYGEN SATURATION: 97 % | TEMPERATURE: 97.9 F | DIASTOLIC BLOOD PRESSURE: 68 MMHG | HEART RATE: 87 BPM | SYSTOLIC BLOOD PRESSURE: 144 MMHG

## 2020-11-13 DIAGNOSIS — Z23 NEED FOR VACCINATION: ICD-10-CM

## 2020-11-13 DIAGNOSIS — R19.01 RUQ ABDOMINAL MASS: ICD-10-CM

## 2020-11-13 DIAGNOSIS — I10 ESSENTIAL HYPERTENSION: Primary | ICD-10-CM

## 2020-11-13 LAB
ANION GAP SERPL CALCULATED.3IONS-SCNC: 7 MMOL/L (ref 3–14)
BUN SERPL-MCNC: 18 MG/DL (ref 7–30)
CALCIUM SERPL-MCNC: 8.6 MG/DL (ref 8.5–10.1)
CHLORIDE SERPL-SCNC: 95 MMOL/L (ref 94–109)
CO2 SERPL-SCNC: 27 MMOL/L (ref 20–32)
CREAT SERPL-MCNC: 0.78 MG/DL (ref 0.52–1.04)
GFR SERPL CREATININE-BSD FRML MDRD: 76 ML/MIN/{1.73_M2}
GLUCOSE SERPL-MCNC: 115 MG/DL (ref 70–99)
POTASSIUM SERPL-SCNC: 3.7 MMOL/L (ref 3.4–5.3)
SODIUM SERPL-SCNC: 129 MMOL/L (ref 133–144)

## 2020-11-13 PROCEDURE — G0009 ADMIN PNEUMOCOCCAL VACCINE: HCPCS

## 2020-11-13 PROCEDURE — 36415 COLL VENOUS BLD VENIPUNCTURE: CPT | Mod: ZL | Performed by: NURSE PRACTITIONER

## 2020-11-13 PROCEDURE — G0463 HOSPITAL OUTPT CLINIC VISIT: HCPCS | Mod: 25

## 2020-11-13 PROCEDURE — 80048 BASIC METABOLIC PNL TOTAL CA: CPT | Mod: ZL | Performed by: NURSE PRACTITIONER

## 2020-11-13 PROCEDURE — 99213 OFFICE O/P EST LOW 20 MIN: CPT | Performed by: NURSE PRACTITIONER

## 2020-11-13 RX ORDER — HYDROCHLOROTHIAZIDE 25 MG/1
25 TABLET ORAL DAILY
Qty: 90 TABLET | Refills: 1 | Status: SHIPPED | OUTPATIENT
Start: 2020-11-13 | End: 2021-02-02

## 2020-11-13 ASSESSMENT — PAIN SCALES - GENERAL: PAINLEVEL: NO PAIN (0)

## 2020-11-13 NOTE — PATIENT INSTRUCTIONS
Arrival/HPI





- General


Chief Complaint: Abnormal Skin Integrity


Time Seen by Provider: 04/13/17 01:10


Historian: Patient





- History of Present Illness


Narrative History of Present Illness (Text): 


04/13/17 01:28


This 18 yo female presents to this ED c/o pruritic vaginal rash x 4 days.  

Patient stated she had been feeling burning sensation on his rash when she 

urinates.  Patient denies STD exposure or vaginal discharge.  Denies hematuria, 

urinary frequency, urgency, fever, pelvic pain, sick contact, or vaginal 

bleeding.





Time/Duration: Other (4 days)


Context: Home





Past Medical History





- Provider Review


Nursing Documentation Reviewed: Yes





- Past History


Past History: No Previous





- Infectious Disease


Hx of Infectious Diseases: None





- Tetanus Immunization


Tetanus Immunization: Unknown





- Psychiatric


Hx Depression: No


Hx Emotional Abuse: No


Hx Physical Abuse: No


Hx Substance Use: No





- Past Surgical History


Past Surgical History: No Previous





- Anesthesia


Hx Anesthesia: No





- Suicidal Assessment


Feels Threatened In Home Enviroment: No





Family/Social History





- Physician Review


Nursing Documentation Reviewed: Yes


Family/Social History: No Known Family HX


Smoking Status: Never Smoked


Hx Alcohol Use: No


Hx Substance Use: No





Allergies/Home Meds


Allergies/Adverse Reactions: 


Allergies





No Known Allergies Allergy (Verified 04/13/17 00:58)


 











Review of Systems





- Review of Systems


Constitutional: Normal.  absent: Fatigue, Weight Change, Fevers


Eyes: Normal


ENT: Normal.  absent: Sore Throat


Respiratory: Normal.  absent: SOB, Cough


Cardiovascular: Normal


Gastrointestinal: Normal.  absent: Abdominal Pain, Nausea, Vomiting


Genitourinary Female: Dysuria.  absent: Frequency, Hematuria, Urine Output 

Changes, Vaginal Bleeding, Vaginal Discharge


Musculoskeletal: Normal.  absent: Back Pain


Skin: Rash (See HPI), Pruritis.  absent: Skin Lesions, Laceration, Abscess, 

Ulcer, Cellulitis


Neurological: Normal.  absent: Headache, Dizziness


Endocrine: Normal


Hemo/Lymphatic: Normal


Psychiatric: Normal





Physical Exam


Vital Signs











  Temp Pulse Resp BP Pulse Ox


 


 04/13/17 02:14  98.6 F  76  18  136/85 H  100


 


 04/13/17 01:04  98.4 F  71  16  119/72  98











Temperature: Afebrile


Blood Pressure: Normal


Pulse: Regular


Respiratory Rate: Normal


Appearance: Positive for: Well-Appearing, Non-Toxic, Comfortable


Pain Distress: None


Mental Status: Positive for: Alert and Oriented X 3





- Systems Exam


Head: Present: Atraumatic, Normocephalic


Pupils: Present: PERRL


Extroacular Muscles: Present: EOMI


Conjunctiva: Present: Normal


Mouth: Present: Moist Mucous Membranes


Neck: Present: Normal Range of Motion


Respiratory/Chest: Present: Clear to Auscultation, Good Air Exchange.  No: 

Respiratory Distress, Accessory Muscle Use


Cardiovascular: Present: Regular Rate and Rhythm, Normal S1, S2.  No: Murmurs


Abdomen: Present: Normal Bowel Sounds.  No: Tenderness, Distention, Peritoneal 

Signs


Genitourinary/Pelvic Exam: Present: Vaginal Discharge (mild cottage cheese 

discharge.), Cervical os Closed, Other ((+) mild irritation, and abrasion on 

external vulva area.  No vesicular rash visualized.  ELISA Quezada was chaperone 

during genital examination).  No: Vaginal Bleeding, Vaginal Lesions, Adenexal 

Tenderness, Adenexal Mass, Cervical Motion Tendernes, Odor


Back: Present: Normal Inspection


Upper Extremity: Present: Normal Inspection.  No: Cyanosis, Edema


Lower Extremity: Present: Normal Inspection.  No: Edema


Neurological: Present: GCS=15, CN II-XII Intact, Speech Normal, Motor Func 

Grossly Intact, Normal Sensory Function, Normal Cerebellar Funct, Gait Normal


Skin: Present: Warm, Dry, Normal Color.  No: Rashes


Psychiatric: Present: Alert, Oriented x 3





Medical Decision Making


ED Course and Treatment: 


04/13/17 01:35


Re-evaluation.  Patient feels better.  Discussed results and plan with patient 

who expresses understanding.  All questions answered and there is agreement 

with the plan to discharge home with instructions.  Patient stable for 

discharge.  Return if symptoms persist or worsen.


Patient requested STD test.  I recommended prophylaxis treatment.  Patient 

understands STD test may take 3-5 days, so she would need to f/u private GYN 

for result.





Re-evaluation Time: 01:36


Reassessment Condition: Re-examined, Improved





- Medication Orders


Current Medication Orders: 











Discontinued Medications





Azithromycin (Zithromax)  1,000 mg PO STAT STA


   PRN Reason: Protocol


   Stop: 04/13/17 01:32


   Last Admin: 04/13/17 02:08  Dose: 1,000 MG





Ceftriaxone Sodium (Rocephin)  250 mg IM STAT STA


   PRN Reason: Protocol


   Stop: 04/13/17 01:32


   Last Admin: 04/13/17 02:08  Dose: 250 MG





IM Administration Charges


 Document     04/13/17 02:08  EQ  (Rec: 04/13/17 02:08  EQ  [id number])


     Injection Site


      MAR Injection Site                         Left Deltoid


     Charges for Administration


      # of IM Administrations                    1





Fluconazole (Diflucan)  200 mg PO STAT STA


   PRN Reason: Protocol


   Stop: 04/13/17 01:31


   Last Admin: 04/13/17 02:08  Dose: 200 MG











Disposition/Present on Arrival





- Present on Arrival


Any Indicators Present on Arrival: No


History of DVT/PE: No


History of Uncontrolled Diabetes: No


Urinary Catheter: No


History of Decub. Ulcer: No


History Surgical Site Infection Following: None





- Disposition


Have Diagnosis and Disposition been Completed?: Yes


Diagnosis: 


 Vulvovaginal candidiasis


Disposition: HOME/ ROUTINE


Disposition Time: 01:39


Patient Plan: Discharge


Condition: GOOD


Discharge Instructions (ExitCare):  Vulvovaginal Candidiasis (ED)


Additional Instructions: 


Call private GYN doctor for follow up visit in 3-5 days.   Take medication as 

instructed.   return to emergency if symptoms worsen.  


Prescriptions: 


Fluconazole [Diflucan] 150 mg PO DAILY #2 tab


Referrals: 


 Service [Outside] - Follow up with primary


[facility] [[facility] - Follow up with primary


Forms:  SCHOOL NOTE Patient Education     Controlling High Blood Pressure   High blood pressure (hypertension) is often called the silent killer. This is because many people who have it, don t know it. It can be very dangerous. High blood pressure can raise your risk of heart attack, stroke, heart disease, and heart failure. Controlling your blood pressure can decrease your risk of these problems. It's important to know the appropriate blood pressure range and remember to check your blood pressure regularly. Doing so can save your life.   Blood pressure measurements are given as 2 numbers. Systolic blood pressure is the upper number. This is the pressure when the heart contracts. Diastolic blood pressure is the lower number. This is the pressure when the heart relaxes between beats.   Blood pressure is categorized as normal, elevated, or stage 1 or stage 2 high blood pressure:     Normal blood pressure is systolic of less than 120 and diastolic of less than 80 (120/80)    Elevated blood pressure is systolic of 120 to 129 and diastolic less than 80    Stage 1 high blood pressure is systolic is 130 to 139 or diastolic between 80 to 89    Stage 2 high blood pressure is when systolic is 140 or higher or the diastolic is 90 or higher  A heart-healthy lifestyle can help you control your blood pressure without medicines. Here are some things you can do to pursue a heart-healthy lifestyle:     Choose heart-healthy foods     Select low-salt, low-fat foods. Limit sodium intake to 2,400 mg per day or the amount suggested by your healthcare provider.    Limit canned, dried, cured, packaged, and fast foods. These can contain a lot of salt.    Eat 8 to 10 servings of fruits and vegetables every day.    Choose lean meats, fish, or chicken.    Eat whole-grain pasta, brown rice, and beans.    Eat 2 to 3 servings of low-fat or fat-free dairy products.    Ask your doctor about the DASH eating plan. This plan helps reduce blood pressure.    When you go to  a restaurant, ask that your meal be prepared with no added salt.    Stay at a healthy weight     Ask your healthcare provider how many calories to eat a day. Then stick to that number.    Ask your healthcare provider what weight range is healthiest for you. If you are overweight, a weight loss of only 3% to 5% of your body weight can help lower blood pressure. Generally, a good weight loss goal is to lose 10% of your body weight in a year.    Limit snacks and sweets.    Get regular exercise.    Get up and get active     Find activities you enjoy that can be done alone or with friends or family. Such activities might include bicycling, dancing, walking, or jogging.    Park farther away from building entrances to walk more.    Use stairs instead of the elevator.    When you can, walk or bike instead of driving.    [place] leaves, garden, or do household repairs.    Be active at a moderate to vigorous level of physical activity for at least 40 minutes for a minimum of 3 to 4 days a week.     Manage stress     Make time to relax and enjoy life. Find time to laugh.    Communicate your concerns with your loved ones and your healthcare provider.    Visit with family and friends, and keep up with hobbies.    Limit alcohol and quit smoking     Men should have no more than 2 drinks per day.    Women should have no more than 1 drink per day.    Talk with your healthcare provider about quitting smoking. Smoking significantly increases your risk for heart disease and stroke. Ask your healthcare provider about community smoking cessation programs and other options.    Medicines  If lifestyle changes aren t enough, your healthcare provider may prescribe high blood pressure medicine. Take all medicines as prescribed. If you have any questions about your medicines, ask your healthcare provider before stopping or changing them.   [employer] last reviewed this educational content on 6/1/2019 2000-2020 [employer] [address]  [place] [address]. All rights reserved. This information is not intended as a substitute for professional medical care. Always follow your healthcare professional's instructions.

## 2020-11-13 NOTE — NURSING NOTE
"Chief Complaint   Patient presents with     Hypertension       Initial BP (!) 144/68   Pulse 87   Temp 97.9  F (36.6  C)   SpO2 97%  Estimated body mass index is 30.19 kg/m  as calculated from the following:    Height as of 8/28/20: 1.654 m (5' 5.1\").    Weight as of 10/14/20: 82.6 kg (182 lb).  Medication Reconciliation: complete  Radha Espino  "

## 2020-11-15 DIAGNOSIS — I10 ESSENTIAL HYPERTENSION: Primary | ICD-10-CM

## 2020-11-19 DIAGNOSIS — I10 ESSENTIAL HYPERTENSION: ICD-10-CM

## 2020-11-19 LAB
ANION GAP SERPL CALCULATED.3IONS-SCNC: 7 MMOL/L (ref 3–14)
BUN SERPL-MCNC: 13 MG/DL (ref 7–30)
CALCIUM SERPL-MCNC: 8.3 MG/DL (ref 8.5–10.1)
CHLORIDE SERPL-SCNC: 93 MMOL/L (ref 94–109)
CO2 SERPL-SCNC: 29 MMOL/L (ref 20–32)
CREAT SERPL-MCNC: 0.8 MG/DL (ref 0.52–1.04)
GFR SERPL CREATININE-BSD FRML MDRD: 74 ML/MIN/{1.73_M2}
GLUCOSE SERPL-MCNC: 128 MG/DL (ref 70–99)
POTASSIUM SERPL-SCNC: 3.5 MMOL/L (ref 3.4–5.3)
SODIUM SERPL-SCNC: 129 MMOL/L (ref 133–144)

## 2020-11-19 PROCEDURE — 36415 COLL VENOUS BLD VENIPUNCTURE: CPT | Mod: ZL | Performed by: NURSE PRACTITIONER

## 2020-11-19 PROCEDURE — 80048 BASIC METABOLIC PNL TOTAL CA: CPT | Mod: ZL | Performed by: NURSE PRACTITIONER

## 2021-01-07 NOTE — PROGRESS NOTES
Assessment & Plan     BP is better controlled. Sodium 130 today. Continue to monitor. Follow up 3 months for BP check with BMP.       (I10) Essential hypertension  (primary encounter diagnosis)  Comment: stable. Monitor sodium  Plan: Basic metabolic panel            (J44.9) Chronic obstructive pulmonary disease, unspecified COPD type (H)  Comment: RF  Plan: albuterol (PROAIR HFA/PROVENTIL HFA/VENTOLIN         HFA) 108 (90 Base) MCG/ACT inhaler              See Patient Instructions    Return in about 3 months (around 4/15/2021) for BP check with lab.    Bela Sandhu, NP  [facility]     Ivonne is a 70 year old who presents to clinic today for the following health issues  accompanied by herself :    HPI       Hypertension Follow-up      Do you check your blood pressure regularly outside of the clinic? Yes     Are you following a low salt diet? Yes    Are your blood pressures ever more than 140 on the top number (systolic) OR more   than 90 on the bottom number (diastolic), for example 140/90? Yes      How many servings of fruits and vegetables do you eat daily?  0-1    On average, how many sweetened beverages do you drink each day (Examples: soda, juice, sweet tea, etc.  Do NOT count diet or artificially sweetened beverages)?   0    How many days per week do you exercise enough to make your heart beat faster? 3 or less    How many minutes a day do you exercise enough to make your heart beat faster? 9 or less    How many days per week do you miss taking your medication? 0    Ivonne has BP log with her today with a range 120-130/60-80's with a couple of SBP in the 140-150's in the afternoon.         Review of Systems   Constitutional, HEENT, cardiovascular, pulmonary, gi and gu systems are negative, except as otherwise noted.      Objective    /84 (BP Location: Right arm, Patient Position: Sitting, Cuff Size: Adult Regular)   Pulse 85   Temp 97.9  F (36.6  C)   Wt  82.1 kg (181 lb)   SpO2 98%   BMI 30.03 kg/m    Body mass index is 30.03 kg/m .  Physical Exam   GENERAL: healthy, alert and no distress  NECK: no adenopathy, no asymmetry, masses, or scars and thyroid normal to palpation  RESP: lungs clear to auscultation - no rales, rhonchi or wheezes  CV: regular rate and rhythm, normal S1 S2, no S3 or S4, no murmur, click or rub, no peripheral edema and peripheral pulses strong  MS: no gross musculoskeletal defects noted, no edema  NEURO: Normal strength and tone, mentation intact and speech normal  PSYCH: mentation appears normal, affect normal/bright    Results for orders placed or performed in visit on 01/15/21   Basic metabolic panel     Status: Abnormal   Result Value Ref Range    Sodium 130 (L) 133 - 144 mmol/L    Potassium 4.2 3.4 - 5.3 mmol/L    Chloride 95 94 - 109 mmol/L    Carbon Dioxide 29 20 - 32 mmol/L    Anion Gap 6 3 - 14 mmol/L    Glucose 103 (H) 70 - 99 mg/dL    Urea Nitrogen 16 7 - 30 mg/dL    Creatinine 0.82 0.52 - 1.04 mg/dL    GFR Estimate 72 >60 mL/min/[1.73_m2]    GFR Estimate If Black 83 >60 mL/min/[1.73_m2]    Calcium 8.7 8.5 - 10.1 mg/dL

## 2021-01-15 ENCOUNTER — OFFICE VISIT (OUTPATIENT)
Dept: FAMILY MEDICINE | Facility: OTHER | Age: 71
End: 2021-01-15
Attending: NURSE PRACTITIONER
Payer: MEDICARE

## 2021-01-15 VITALS
OXYGEN SATURATION: 98 % | TEMPERATURE: 97.9 F | HEART RATE: 85 BPM | DIASTOLIC BLOOD PRESSURE: 84 MMHG | SYSTOLIC BLOOD PRESSURE: 130 MMHG | BODY MASS INDEX: 30.03 KG/M2 | WEIGHT: 181 LBS

## 2021-01-15 DIAGNOSIS — I10 ESSENTIAL HYPERTENSION: Primary | ICD-10-CM

## 2021-01-15 DIAGNOSIS — J44.9 CHRONIC OBSTRUCTIVE PULMONARY DISEASE, UNSPECIFIED COPD TYPE (H): ICD-10-CM

## 2021-01-15 LAB
ANION GAP SERPL CALCULATED.3IONS-SCNC: 6 MMOL/L (ref 3–14)
BUN SERPL-MCNC: 16 MG/DL (ref 7–30)
CALCIUM SERPL-MCNC: 8.7 MG/DL (ref 8.5–10.1)
CHLORIDE SERPL-SCNC: 95 MMOL/L (ref 94–109)
CO2 SERPL-SCNC: 29 MMOL/L (ref 20–32)
CREAT SERPL-MCNC: 0.82 MG/DL (ref 0.52–1.04)
GFR SERPL CREATININE-BSD FRML MDRD: 72 ML/MIN/{1.73_M2}
GLUCOSE SERPL-MCNC: 103 MG/DL (ref 70–99)
POTASSIUM SERPL-SCNC: 4.2 MMOL/L (ref 3.4–5.3)
SODIUM SERPL-SCNC: 130 MMOL/L (ref 133–144)

## 2021-01-15 PROCEDURE — 99213 OFFICE O/P EST LOW 20 MIN: CPT | Performed by: NURSE PRACTITIONER

## 2021-01-15 PROCEDURE — 80048 BASIC METABOLIC PNL TOTAL CA: CPT | Mod: ZL | Performed by: NURSE PRACTITIONER

## 2021-01-15 PROCEDURE — 36415 COLL VENOUS BLD VENIPUNCTURE: CPT | Mod: ZL | Performed by: NURSE PRACTITIONER

## 2021-01-15 PROCEDURE — G0463 HOSPITAL OUTPT CLINIC VISIT: HCPCS

## 2021-01-15 RX ORDER — ALBUTEROL SULFATE 90 UG/1
2 AEROSOL, METERED RESPIRATORY (INHALATION) EVERY 6 HOURS
Qty: 18 G | Refills: 0 | Status: SHIPPED | OUTPATIENT
Start: 2021-01-15

## 2021-01-15 ASSESSMENT — PAIN SCALES - GENERAL: PAINLEVEL: NO PAIN (0)

## 2021-01-15 NOTE — NURSING NOTE
"Chief Complaint   Patient presents with     Hypertension       Initial /84 (BP Location: Right arm, Patient Position: Sitting, Cuff Size: Adult Regular)   Pulse 85   Temp 97.9  F (36.6  C)   Wt 82.1 kg (181 lb)   SpO2 98%   BMI 30.03 kg/m   Estimated body mass index is 30.03 kg/m  as calculated from the following:    Height as of 8/28/20: 1.654 m (5' 5.1\").    Weight as of this encounter: 82.1 kg (181 lb).  Medication Reconciliation: complete  Radha Espino  "

## 2021-01-17 NOTE — PATIENT INSTRUCTIONS
Patient Education     Established High Blood Pressure    High blood pressure (hypertension) is a long-term (chronic) disease. Often healthcare providers don t know what causes it. But it can be caused by certain health conditions and medicines.  If you have high blood pressure, you may not have any symptoms. If you do have symptoms, they may include:    Headache    Dizziness    Changes in your vision    Chest pain    Shortness of breath  But even without symptoms, high blood pressure that s not treated raises your risk for heart attack, heart failure, kidney disease, and stroke. High blood pressure is a serious health risk and shouldn t be ignored.  Blood pressure measurements are given as 2 numbers. Systolic blood pressure is the upper number. This is the pressure when the heart contracts. Diastolic blood pressure is the lower number. This is the pressure when the heart relaxes between beats. You will see your blood pressure readings written together. For example, a person with a systolic pressure of 118 and a diastolic pressure of 78 will have 118/78 written in the medical record.  Blood pressure is classified as normal, raised (elevated) or stage 1 or stage 2 high blood pressure:    Normal blood pressure. Systolic of less than 120 and diastolic of less than 80 (120/80).    Elevated blood pressure. Systolic of 120 to 129 and diastolic less than 80.    Stage 1 high blood pressure. Systolic is 130 to 139 or diastolic between 80 to 89.    Stage 2 high blood pressure. Systolic is 140 or higher or the diastolic is 90 or higher.  Home care  If you have high blood pressure, follow these home care guidelines to help lower your blood pressure. If you are taking medicines for high blood pressure, these methods may reduce or end your need for medicines in the future.    Start a weight-loss program if you are overweight.    Cut back on how much salt you get in your diet. Here s how to do this:  ? Don t eat foods that have a  lot of salt. These include olives, pickles, smoked meats, and salted potato chips.  ? Don t add salt to your food at the table.  ? Use only small amounts of salt when cooking.    Start an exercise program. Talk with your healthcare provider about the type of exercise program that would be best for you. It doesn't have to be hard. Even brisk walking for 20 minutes 3 times a week is a good form of exercise.    Don t take medicines that stimulate the heart. This includes many over-the-counter cold and sinus decongestant pills and sprays, as well as diet pills. Check the warnings about high blood pressure on the label. Before buying any over-the-counter medicines or supplements, always ask the pharmacist about the product's possible interaction with your high blood pressure and your high blood pressure medicines.    Stimulants such as amphetamine or cocaine could be deadly for someone with high blood pressure. Never take these.    Limit how much caffeine you get in your diet. Switch to caffeine-free products.    Stop smoking. If you are a long-time smoker, this can be hard. Talk with your healthcare provider about medicines and nicotine replacement options to help you. Also join a stop-smoking program . This makes it more likely that you will quit for good.    Learn how to handle stress. This is an important part of any program to lower blood pressure. Learn about relaxation methods such as meditation, yoga, or biofeedback.    If your provider prescribed medicines, take them exactly as directed. Missing doses may cause your blood pressure get out of control.    If you miss a dose, check with your healthcare provider or pharmacist about what to do.    Think about buying an automatic blood pressure machine to check your blood pressure at home. Ask your provider for a recommendation. You can get one of these at most pharmacies.  Using a home blood pressure monitor  The American Heart Association advises the following  guidelines for home blood pressure monitoring:    Don't smoke or drink coffee for 30 minutes before taking your blood pressure.    Go to the bathroom before the test.    Relax for 5 minutes before taking the measurement.    Sit with your back supported (don't sit on a couch or soft chair). Keep your feet on the floor uncrossed. Place your arm on a solid flat surface (such as a table) with the upper part of the arm at heart level. Place the middle of the cuff directly above the bend of the elbow. Check the monitor's instruction manual for an illustration.    Take multiple readings. When you measure, take 2 to 3 readings one minute apart and record all of the results.    Take your blood pressure at the same time every day, or as your healthcare provider advises.    Record the date, time, and blood pressure reading.    Take the record with you to your next healthcare appointment. If your blood pressure monitor has a built-in memory, just take the monitor with you to your next appointment.    Call your provider if you have several high readings. Don't be frightened by one high blood pressure reading. But if you get a few high readings, check in with your healthcare provider.  Follow-up care  You will need to see your healthcare provider regularly. This is to check your blood pressure and to make changes to your medicines. Make a follow-up appointment as directed. Bring the record of your home blood pressure readings to the appointment.  Call 911  [id number] you have any of these:    Blood pressure of 180/120 or higher    Chest pain or shortness of breath    Weakness of an arm or leg or one side of the face    Problems speaking or seeing    When to get medical advice  Call your healthcare provider right away if any of these occur:    Severe headache    Throbbing or rushing sound in the ears    Nosebleed    Sudden severe pain in your belly (abdomen)    Extreme drowsiness, confusion, or fainting    Dizziness or spinning  feeling (vertigo)  [employer] last reviewed this educational content on 7/1/2019 2000-2020 [employer]. [address] [place] [address], [place] [address]. All rights reserved. This information is not intended as a substitute for professional medical care. Always follow your healthcare professional's instructions.

## 2021-02-01 ENCOUNTER — TELEPHONE (OUTPATIENT)
Dept: FAMILY MEDICINE | Facility: OTHER | Age: 71
End: 2021-02-01

## 2021-02-01 PROBLEM — I10 ESSENTIAL HYPERTENSION: Status: ACTIVE | Noted: 2021-02-01

## 2021-02-01 NOTE — TELEPHONE ENCOUNTER
Spoke with patient states that she is still having some dizzy spells, and got flushed feeling. Stopped taking her hydrochlorothiazide this weekend. Than it went really high. 158/91 1/30/21 at 1630.  Started taking the medication again. I advised her that it is not safe to just stop taking a medication without talking to your dr first. She didn't know that , she states. I got her scheduled for a telephone apt for tomorrow with pcp to go over her htn meds with possible change. Due to symptoms.     ROXI Garcia

## 2021-02-01 NOTE — TELEPHONE ENCOUNTER
10:40 AM    Reason for Call: Phone Call    Description: wants to talk to nurse, she thinks she needs to change her bp meds    Was an appointment offered for this call? No  If yes : Appointment type              Date    Preferred method for responding to this message: Telephone Call  What is your phone number ?[phone]    If we cannot reach you directly, may we leave a detailed response at the number you provided? Yes    Can this message wait until your PCP/provider returns, if available today? Not applicable    Lyudmila Tomas

## 2021-02-02 ENCOUNTER — TELEPHONE (OUTPATIENT)
Dept: FAMILY MEDICINE | Facility: OTHER | Age: 71
End: 2021-02-02

## 2021-02-04 ENCOUNTER — NURSE TRIAGE (OUTPATIENT)
Dept: FAMILY MEDICINE | Facility: OTHER | Age: 71
End: 2021-02-04

## 2021-02-04 NOTE — TELEPHONE ENCOUNTER
"Bela told me to check my BP and record my readings at home.Pt reports starting the lisinopril today. Stopped hydrochlorothiazide on 2/2/21. \"she stopped the hydrochlorothiazide because I was having dizziness and rash and facial swelling\". Reports all symptoms have improved. Pt's only concern at this time is BP and reports dizziness.     Readings from today:   115/72 8 AM  105/64 11 AM  93/59 12 PM    Pt reports dizziness. \"the pharmacy told me to call to the doctor\".  Per protocol go to ED pt advised to ED agrees to plan. Pt reports  will drive her now.        Reason for Disposition    [1] Fall in systolic BP > 20 mm Hg from normal AND [2] dizzy, lightheaded, or weak    Additional Information    Negative: Started suddenly after an allergic medicine, an allergic food, or bee sting    Negative: Shock suspected (e.g., cold/pale/clammy skin, too weak to stand, low BP, rapid pulse)    Negative: Difficult to awaken or acting confused (e.g., disoriented, slurred speech)    Negative: Fainted    Negative: [1] Systolic BP < 90 AND [2] dizzy, lightheaded, or weak    Negative: Chest pain    Negative: Bleeding (e.g., vomiting blood, rectal bleeding or tarry stools, severe vaginal bleeding)(Exception: fainted from sight of small amount of blood; small cut or abrasion)    Negative: Extra heart beats or heart is beating fast  (i.e., \"palpitations\")    Negative: Sounds like a life-threatening emergency to the triager    Negative: [1] Systolic BP < 80 AND [2] NOT dizzy, lightheaded or weak    Negative: Abdominal pain    Negative: Fever > 100.5 F (38.1 C)    Negative: Major surgery in the past month    Negative: [1] Drinking very little AND [2] dehydration suspected (e.g., no urine > 12 hours, very dry mouth, very lightheaded)    Answer Assessment - Initial Assessment Questions  1. BLOOD PRESSURE: \"What is the blood pressure?\" \"Did you take at least two measurements 5 minutes apart?\"      Home BP monitor  2. ONSET: \"When " "did you take your blood pressure?\"      today  3. HOW: \"How did you obtain the blood pressure?\" (e.g., visiting nurse, automatic home BP monitor)        4. HISTORY: \"Do you have a history of low blood pressure?\" \"What is your blood pressure normally?\"      no  5. MEDICATIONS: \"Are you taking any medications for blood pressure?\" If yes: \"Have they been changed recently?\"      Yes recent change see note  6. PULSE RATE: \"Do you know what your pulse rate is?\"       68  7. OTHER SYMPTOMS: \"Have you been sick recently?\" \"Have you had a recent injury?\"      no  8. PREGNANCY: \"Is there any chance you are pregnant?\" \"When was your last menstrual period?\"      no    Protocols used: LOW BLOOD PRESSURE-A-AH      "

## 2021-02-08 ENCOUNTER — TELEPHONE (OUTPATIENT)
Dept: FAMILY MEDICINE | Facility: OTHER | Age: 71
End: 2021-02-08

## 2021-02-08 NOTE — TELEPHONE ENCOUNTER
11:16 AM    Reason for Call: Phone Call    Description: pt is having problems with Bld Pres Rx/ please call pt    Was an appointment offered for this call? No  If yes : Appointment type              Date    Preferred method for responding to this message: Telephone Call  What is your phone number ? [phone]    If we cannot reach you directly, may we leave a detailed response at the number you provided? Yes    Can this message wait until your PCP/provider returns, if available today? YES, No    Juliana Malik

## 2021-02-09 ENCOUNTER — TELEPHONE (OUTPATIENT)
Dept: FAMILY MEDICINE | Facility: OTHER | Age: 71
End: 2021-02-09

## 2021-02-09 ENCOUNTER — HOSPITAL ENCOUNTER (EMERGENCY)
Facility: HOSPITAL | Age: 71
Discharge: HOME | End: 2021-02-09
Attending: NURSE PRACTITIONER | Admitting: NURSE PRACTITIONER
Payer: MEDICARE

## 2021-02-09 VITALS
TEMPERATURE: 98.1 F | HEART RATE: 76 BPM | OXYGEN SATURATION: 97 % | DIASTOLIC BLOOD PRESSURE: 95 MMHG | RESPIRATION RATE: 16 BRPM | SYSTOLIC BLOOD PRESSURE: 174 MMHG

## 2021-02-09 DIAGNOSIS — E87.1 HYPONATREMIA: ICD-10-CM

## 2021-02-09 DIAGNOSIS — I10 ESSENTIAL HYPERTENSION: Primary | ICD-10-CM

## 2021-02-09 LAB
ANION GAP SERPL CALCULATED.3IONS-SCNC: 6 MMOL/L (ref 3–14)
BASOPHILS # BLD AUTO: 0 10E9/L (ref 0–0.2)
BASOPHILS NFR BLD AUTO: 0 %
BUN SERPL-MCNC: 15 MG/DL (ref 7–30)
CALCIUM SERPL-MCNC: 8.4 MG/DL (ref 8.5–10.1)
CHLORIDE SERPL-SCNC: 91 MMOL/L (ref 94–109)
CO2 SERPL-SCNC: 28 MMOL/L (ref 20–32)
CREAT SERPL-MCNC: 0.71 MG/DL (ref 0.52–1.04)
DIFFERENTIAL METHOD BLD: ABNORMAL
EOSINOPHIL # BLD AUTO: 0.2 10E9/L (ref 0–0.7)
EOSINOPHIL NFR BLD AUTO: 4.2 %
ERYTHROCYTE [DISTWIDTH] IN BLOOD BY AUTOMATED COUNT: 12.1 % (ref 10–15)
GFR SERPL CREATININE-BSD FRML MDRD: 85 ML/MIN/{1.73_M2}
GLUCOSE SERPL-MCNC: 110 MG/DL (ref 70–99)
HCT VFR BLD AUTO: 37.6 % (ref 35–47)
HGB BLD-MCNC: 13.3 G/DL (ref 11.7–15.7)
IMM GRANULOCYTES # BLD: 0 10E9/L (ref 0–0.4)
IMM GRANULOCYTES NFR BLD: 0.2 %
LYMPHOCYTES # BLD AUTO: 0.6 10E9/L (ref 0.8–5.3)
LYMPHOCYTES NFR BLD AUTO: 10.3 %
MCH RBC QN AUTO: 31.5 PG (ref 26.5–33)
MCHC RBC AUTO-ENTMCNC: 35.4 G/DL (ref 31.5–36.5)
MCV RBC AUTO: 89 FL (ref 78–100)
MONOCYTES # BLD AUTO: 0.6 10E9/L (ref 0–1.3)
MONOCYTES NFR BLD AUTO: 10.7 %
NEUTROPHILS # BLD AUTO: 4 10E9/L (ref 1.6–8.3)
NEUTROPHILS NFR BLD AUTO: 74.6 %
NRBC # BLD AUTO: 0 10*3/UL
NRBC BLD AUTO-RTO: 0 /100
PLATELET # BLD AUTO: 208 10E9/L (ref 150–450)
POTASSIUM SERPL-SCNC: 3.4 MMOL/L (ref 3.4–5.3)
RBC # BLD AUTO: 4.22 10E12/L (ref 3.8–5.2)
SODIUM SERPL-SCNC: 125 MMOL/L (ref 133–144)
WBC # BLD AUTO: 5.4 10E9/L (ref 4–11)

## 2021-02-09 PROCEDURE — 80048 BASIC METABOLIC PNL TOTAL CA: CPT | Performed by: NURSE PRACTITIONER

## 2021-02-09 PROCEDURE — 99213 OFFICE O/P EST LOW 20 MIN: CPT | Performed by: NURSE PRACTITIONER

## 2021-02-09 PROCEDURE — 36415 COLL VENOUS BLD VENIPUNCTURE: CPT | Performed by: NURSE PRACTITIONER

## 2021-02-09 PROCEDURE — 85025 COMPLETE CBC W/AUTO DIFF WBC: CPT | Performed by: NURSE PRACTITIONER

## 2021-02-09 PROCEDURE — G0463 HOSPITAL OUTPT CLINIC VISIT: HCPCS

## 2021-02-09 RX ORDER — HYDROCHLOROTHIAZIDE 25 MG/1
TABLET ORAL
COMMUNITY
Start: 2021-01-24 | End: 2021-02-11

## 2021-02-09 ASSESSMENT — ENCOUNTER SYMPTOMS
SPEECH DIFFICULTY: 0
SHORTNESS OF BREATH: 0
NUMBNESS: 0
HEADACHES: 0
PALPITATIONS: 0
WEAKNESS: 0
DIZZINESS: 0

## 2021-02-09 NOTE — TELEPHONE ENCOUNTER
Patient calling about her blood pressure.  At 4:50pm her /99.  She took a Mirna aspirin afterward.  Would like to know what to do next.    Please call at [phone]

## 2021-02-09 NOTE — ED TRIAGE NOTES
"Pt states she has been trying to control her BP. Pt states she has had some med changes \"and my BP was doing well until today\". States /112 \"and at one time the top number was 216\". Denies any pain or headache or recent illness.  "

## 2021-02-09 NOTE — TELEPHONE ENCOUNTER
Patient quit taking lisinopril the first day 93/59 after taking her pressure. She was dizzy. Taking her pressure every day.  Went back on the hydrochlorothiazide 25mg again. 127/80.     advsed patient If it goes up past 140/90 before her next apt to call and get in earlier to be assessed.     ROXI Garcia

## 2021-02-10 NOTE — ED TRIAGE NOTES
Pt is here with c/o HTN today  Pt reports she is on BP medication and she was placed on lisinopril and stated it made her BP low per pt report so she stopped it herself  without talking with her PCP and started taking her old BP medication hydrochlorothiazide and states she has been fine except for today   Pt denies SOB, denies CP, denies dizziness, denies numbness or tingling in her extremities, denies H/A  Pt only reports that when her BP reaches its highest today she had ringing in her ear   Pt reports she took a 81 MG aspirin in hopes that it would lower BP  Pt notes she has an nasreen via phone with PCP on Tuesday  Pt reports the only new thing she has done today was eat walnuts and they were unsalted

## 2021-02-10 NOTE — PROGRESS NOTES
Assessment & Plan     Sodium low at 126. Discussed with Ivonne that she should continue Lisinopril at 5 mg daily and keep a BP log. Discussed that she doesn't need to over check her BP. Discussed that she can't take any hydrochlorothiazide as her sodium is low and we need to keep a close eye on sodium. She adds no extra sodium to diet.     Ivonne seems to have an issue with her BP on the weekend. She asked if her BP increases if she can take additional 5 mg of Lisinopril and I am good with that. Just no hydrochlorothiazide.     (E87.1) Hyponatremia  (primary encounter diagnosis)  Comment: check lab today  Plan: Basic metabolic panel            (R19.01) RUQ abdominal mass  Comment: reorder from . She wanted to wait for COVID pandemic to wind down before she was scanned. She is scheduled for COVID vaccine  Plan: CT Abdomen Pelvis w Contrast          }       Denies chest pain or See Patient Instructions    Return in about 2 weeks (around 2/25/2021) for BP check and lab. Lab only 1 week for BMP for hyponatremia.    Bela Sandhu NP  [facility]   Ivonne is a 70 year old who presents for the following health issues     HPI       ED[place] Followup[place]    [place]  [facility] [place] of visit: 2/9/2021  Reason for visit: sodium, htn   Current Status: feeling much better. Back on Lisinopril 5mg   BP better with a range of 110's-140/70-80's  She has not taken hydrochlorothiazide since being seen in UC     Denies chest pain or SOB.   Denies dizziness.     Review of Systems   Constitutional, HEENT, cardiovascular, pulmonary, gi and gu systems are negative, except as otherwise noted.      Objective    /78 (BP Location: Right arm, Patient Position: Sitting, Cuff Size: Adult Regular)   Pulse 80   Temp 98.1  F (36.7  C)   Wt 83.5 kg (184 lb)   SpO2 98%   BMI 30.53 kg/m    Body mass index is 30.53 kg/m .  Physical Exam   GENERAL: healthy, alert and no distress  NECK: no  adenopathy, no asymmetry, masses, or scars and thyroid normal to palpation  RESP: lungs clear to auscultation - no rales, rhonchi or wheezes  CV: regular rate and rhythm, normal S1 S2, no S3 or S4, no murmur, click or rub, no peripheral edema and peripheral pulses strong  MS: no gross musculoskeletal defects noted, no edema  SKIN: no suspicious lesions or rashes  NEURO: Normal strength and tone, mentation intact and speech normal  PSYCH: mentation appears normal, affect normal/bright    Results for orders placed or performed in visit on 02/11/21 (from the past 24 hour(s))   Basic metabolic panel   Result Value Ref Range    Sodium 126 (L) 133 - 144 mmol/L    Potassium 4.1 3.4 - 5.3 mmol/L    Chloride 93 (L) 94 - 109 mmol/L    Carbon Dioxide 29 20 - 32 mmol/L    Anion Gap 4 3 - 14 mmol/L    Glucose 91 70 - 99 mg/dL    Urea Nitrogen 16 7 - 30 mg/dL    Creatinine 0.79 0.52 - 1.04 mg/dL    GFR Estimate 75 >60 mL/min/[1.73_m2]    GFR Estimate If Black 86 >60 mL/min/[1.73_m2]    Calcium 8.5 8.5 - 10.1 mg/dL     Continue to monitor sodium. Recheck BMP in 1 week.

## 2021-02-10 NOTE — DISCHARGE INSTRUCTIONS
Not taking hydrochlorothiazide.  Start taking 5 mg of lisinopril (0.5 tablet).  Continue checking your blood pressures.     Schedule an appointment with your doctor in 2 to 3 days to get your sodium rechecked.    Return to emergency department for worsening or concerning symptoms such as chest pain, shortness of breath, numbness tingling etc.

## 2021-02-10 NOTE — ED PROVIDER NOTES
History     Chief Complaint   Patient presents with     Hypertension     HPI  Ivonne Kirkland is a 70 year old female who presents ambulatory to urgent care with her  for concerns of high blood pressure.  Patient is currently being treated for hypertension by PCP.  She was originally on 25 mg hydrochlorothiazide for about a month when she reported to PCP that she was having dizziness and numbness to her bottom lip associated with hydrochlorothiazide.  Patient was advised to stop hydrochlorothiazide and was started on 10 mg lisinopril on 2/2/2021.      2/2/2021: Started 10mg lisinopril  2/4/2021: SBP  patient felt dizzy with position changes and had some ringing to her ears.    2//5/2021: Patient independently stopped taking lisinopril and switched back to 25mg hydrochlorothiazide. -138  2/9/2021: -200. About 30 minutes prior to this arrival she recorded her highest blood pressure of 200/112.      She denies chest pain, shortness of breath, numbness or tingling, headaches, dizziness or changes to her vision.  She tells me she did take 2 x 81 mg aspirin today when she noted her blood pressures were increasing.     Patient notes only change in her diet today is that she ate some walnuts with no added salt.    Allergies:  Allergies   Allergen Reactions     Cefuroxime Other (See Comments) and Nausea     dizziness     Clindamycin Hcl Diarrhea     Codeine Sulfate Other (See Comments)     Passed out     Morphine Sulfate Nausea and Vomiting     Rofecoxib      Dizzy, weak feeling, and sick feeling.      Tramadol Other (See Comments) and Nausea       Problem List:    Patient Active Problem List    Diagnosis Date Noted     Essential hypertension 02/01/2021     Priority: Medium     Chronic obstructive pulmonary disease (H) 11/10/2020     Priority: Medium        Past Medical History:    Past Medical History:   Diagnosis Date     COPD (chronic obstructive pulmonary disease) (H)        Past Surgical  History:    Past Surgical History:   Procedure Laterality Date     APPENDECTOMY       CHOLECYSTECTOMY       HYSTERECTOMY TOTAL ABDOMINAL      Approach unknown- abd vs vag?     ORTHOPEDIC SURGERY      right wrist fracture     ORTHOPEDIC SURGERY      left elbow cyst     SOFT TISSUE SURGERY      I and D cysts on back and chest       Family History:    Family History   Problem Relation Age of Onset     Diabetes Mother      Pancreatic Cancer Mother 63     Diabetes Maternal Grandfather      Lung Cancer Father 59     Abdominal Aortic Aneurysm Father      Diabetes Sister      Heart Failure Sister      Diabetes Brother      Diabetes Brother      Hypertension No family hx of      Hyperlipidemia No family hx of      Asthma No family hx of      Thyroid Disease No family hx of        Social History:  Marital Status:   [2]  Social History     Tobacco Use     Smoking status: Former Smoker     Packs/day: 1.00     Years: 42.00     Pack years: 42.00     Types: Cigarettes     Start date:      Quit date: 2019     Years since quittin.8     Smokeless tobacco: Never Used     Tobacco comment: 1/2 or 1 pack per day    Substance Use Topics     Alcohol use: Yes     Alcohol/week: 0.0 standard drinks     Comment: occassional     Drug use: No        Medications:         ASPIRIN PO       hydrochlorothiazide (HYDRODIURIL) 25 MG tablet       lisinopril (ZESTRIL) 10 MG tablet       ADVAIR DISKUS 250-50 MCG/DOSE inhaler       albuterol (PROAIR HFA/PROVENTIL HFA/VENTOLIN HFA) 108 (90 Base) MCG/ACT inhaler       Diclofenac Sodium (VOLTAREN EX)       FISH OIL-KRILL OIL PO       Homeopathic Products (THERAWORX RELIEF) FOAM       predniSONE (DELTASONE) 20 MG tablet          Review of Systems   HENT: Positive for tinnitus (Resolved).    Respiratory: Negative for shortness of breath.    Cardiovascular: Negative for chest pain and palpitations.        Elevated blood pressure.   Neurological: Negative for dizziness, speech difficulty,  weakness, numbness and headaches.   All other systems reviewed and are negative.      Physical Exam   BP: 174/95  Pulse: 82  Temp: 98.1  F (36.7  C)  Resp: 16  SpO2: 99 %      Physical Exam  Vitals signs and nursing note reviewed.   Constitutional:       Appearance: Normal appearance. She is not ill-appearing or toxic-appearing.   HENT:      Head: Normocephalic and atraumatic.      Right Ear: Tympanic membrane and ear canal normal.      Left Ear: Tympanic membrane and ear canal normal.      Nose: Nose normal.      Mouth/Throat:      Mouth: Mucous membranes are moist.   Eyes:      Extraocular Movements: Extraocular movements intact.      Pupils: Pupils are equal, round, and reactive to light.   Neck:      Musculoskeletal: Neck supple.   Cardiovascular:      Rate and Rhythm: Normal rate and regular rhythm.      Heart sounds: Normal heart sounds. No murmur.   Pulmonary:      Effort: Pulmonary effort is normal. No respiratory distress.      Breath sounds: Normal breath sounds.   Abdominal:      Palpations: Abdomen is soft.   Musculoskeletal: Normal range of motion.   Skin:     General: Skin is warm and dry.      Capillary Refill: Capillary refill takes less than 2 seconds.   Neurological:      General: No focal deficit present.      Mental Status: She is alert and oriented to person, place, and time.      Cranial Nerves: No cranial nerve deficit.      Sensory: No sensory deficit.      Motor: No weakness.      Coordination: Coordination normal.      Gait: Gait normal.   Psychiatric:         Mood and Affect: Mood normal.         ED Course        Procedures               Results for orders placed or performed during the hospital encounter of 02/09/21 (from the past 24 hour(s))   CBC with platelets differential   Result Value Ref Range    WBC 5.4 4.0 - 11.0 10e9/L    RBC Count 4.22 3.8 - 5.2 10e12/L    Hemoglobin 13.3 11.7 - 15.7 g/dL    Hematocrit 37.6 35.0 - 47.0 %    MCV 89 78 - 100 fl    MCH 31.5 26.5 - 33.0 pg    MCHC  35.4 31.5 - 36.5 g/dL    RDW 12.1 10.0 - 15.0 %    Platelet Count 208 150 - 450 10e9/L    Diff Method Automated Method     % Neutrophils 74.6 %    % Lymphocytes 10.3 %    % Monocytes 10.7 %    % Eosinophils 4.2 %    % Basophils 0.0 %    % Immature Granulocytes 0.2 %    Nucleated RBCs 0 0 /100    Absolute Neutrophil 4.0 1.6 - 8.3 10e9/L    Absolute Lymphocytes 0.6 (L) 0.8 - 5.3 10e9/L    Absolute Monocytes 0.6 0.0 - 1.3 10e9/L    Absolute Eosinophils 0.2 0.0 - 0.7 10e9/L    Absolute Basophils 0.0 0.0 - 0.2 10e9/L    Abs Immature Granulocytes 0.0 0 - 0.4 10e9/L    Absolute Nucleated RBC 0.0    Basic metabolic panel   Result Value Ref Range    Sodium 125 (L) 133 - 144 mmol/L    Potassium 3.4 3.4 - 5.3 mmol/L    Chloride 91 (L) 94 - 109 mmol/L    Carbon Dioxide 28 20 - 32 mmol/L    Anion Gap 6 3 - 14 mmol/L    Glucose 110 (H) 70 - 99 mg/dL    Urea Nitrogen 15 7 - 30 mg/dL    Creatinine 0.71 0.52 - 1.04 mg/dL    GFR Estimate 85 >60 mL/min/[1.73_m2]    GFR Estimate If Black >90 >60 mL/min/[1.73_m2]    Calcium 8.4 (L) 8.5 - 10.1 mg/dL       Medications - No data to display    Assessments & Plan (with Medical Decision Making)     I have reviewed the nursing notes.    I have reviewed the findings, diagnosis, plan and need for follow up with the patient.  70-year-old female that presented to urgent care for concerns of elevated blood pressure readings.  Patient is asymptomatic.  Patient was on 25 mg hydrochlorothiazide which was discontinued by PCP on 2/2/2021.  Patient started 10 mg lisinopril and felt her blood pressure was too low and she had dizziness with position changes.  Patient has switched back to hydrochlorothiazide independently.  Highest blood pressure reading today was 200/112.  On arrival her blood pressure was noted to be 174/95.  CBC unremarkable.  Normal kidney function.  Sodium is low today at 125.  Patient has had hyponatremia on hydrochlorothiazide which PCP was keeping a close eye on.  No focal  neurological deficits today.  No abnormal findings with today's physical exam.    Discussed findings with patient and spouse extensively.  Advised her to stop taking hydrochlorothiazide as it appears to be affecting her sodium.  Recommended she start taking 5 mg lisinopril (half a tablet) and can continue monitoring her blood pressure.  She will need her sodium rechecked in 2 to 3 days.  Advised her to return immediately to emergency department for symptoms not limited to chest pain, shortness of breath, numbness and tingling, severe headache.  Patient does have an appointment with PCP on 2/16/2021.  Recommended she keep appointment for recheck of her blood pressure and possible medication adjustment.    This document was prepared using a combination of typing and voice generated software.  While every attempt was made for accuracy, spelling and grammatical errors may exist.    New Prescriptions    No medications on file       Final diagnoses:   Essential hypertension   Hyponatremia       2/9/2021   [facility]     Mpofu, Prudence, CNP  02/09/21 1932

## 2021-02-11 ENCOUNTER — OFFICE VISIT (OUTPATIENT)
Dept: FAMILY MEDICINE | Facility: OTHER | Age: 71
End: 2021-02-11
Attending: NURSE PRACTITIONER
Payer: MEDICARE

## 2021-02-11 VITALS
DIASTOLIC BLOOD PRESSURE: 78 MMHG | WEIGHT: 184 LBS | TEMPERATURE: 98.1 F | BODY MASS INDEX: 30.53 KG/M2 | OXYGEN SATURATION: 98 % | HEART RATE: 80 BPM | SYSTOLIC BLOOD PRESSURE: 132 MMHG

## 2021-02-11 DIAGNOSIS — E87.1 HYPONATREMIA: Primary | ICD-10-CM

## 2021-02-11 DIAGNOSIS — R19.01 RUQ ABDOMINAL MASS: ICD-10-CM

## 2021-02-11 LAB
ANION GAP SERPL CALCULATED.3IONS-SCNC: 4 MMOL/L (ref 3–14)
BUN SERPL-MCNC: 16 MG/DL (ref 7–30)
CALCIUM SERPL-MCNC: 8.5 MG/DL (ref 8.5–10.1)
CHLORIDE SERPL-SCNC: 93 MMOL/L (ref 94–109)
CO2 SERPL-SCNC: 29 MMOL/L (ref 20–32)
CREAT SERPL-MCNC: 0.79 MG/DL (ref 0.52–1.04)
GFR SERPL CREATININE-BSD FRML MDRD: 75 ML/MIN/{1.73_M2}
GLUCOSE SERPL-MCNC: 91 MG/DL (ref 70–99)
POTASSIUM SERPL-SCNC: 4.1 MMOL/L (ref 3.4–5.3)
SODIUM SERPL-SCNC: 126 MMOL/L (ref 133–144)

## 2021-02-11 PROCEDURE — G0463 HOSPITAL OUTPT CLINIC VISIT: HCPCS

## 2021-02-11 PROCEDURE — 80048 BASIC METABOLIC PNL TOTAL CA: CPT | Mod: ZL | Performed by: NURSE PRACTITIONER

## 2021-02-11 PROCEDURE — 99213 OFFICE O/P EST LOW 20 MIN: CPT | Performed by: NURSE PRACTITIONER

## 2021-02-11 PROCEDURE — 36415 COLL VENOUS BLD VENIPUNCTURE: CPT | Mod: ZL | Performed by: NURSE PRACTITIONER

## 2021-02-11 ASSESSMENT — PAIN SCALES - GENERAL: PAINLEVEL: NO PAIN (0)

## 2021-02-11 NOTE — NURSING NOTE
"Chief Complaint   Patient presents with     Urgent Care     follow up        Initial /78 (BP Location: Right arm, Patient Position: Sitting, Cuff Size: Adult Regular)   Pulse 80   Temp 98.1  F (36.7  C)   Wt 83.5 kg (184 lb)   SpO2 98%   BMI 30.53 kg/m   Estimated body mass index is 30.53 kg/m  as calculated from the following:    Height as of 8/28/20: 1.654 m (5' 5.1\").    Weight as of this encounter: 83.5 kg (184 lb).  Medication Reconciliation: complete  Radha Espino  "

## 2021-02-16 ENCOUNTER — HOSPITAL ENCOUNTER (OUTPATIENT)
Facility: HOSPITAL | Age: 71
Setting detail: OBSERVATION
LOS: 1 days | Discharge: HOME | End: 2021-02-17
Attending: EMERGENCY MEDICINE | Admitting: INTERNAL MEDICINE
Payer: MEDICARE

## 2021-02-16 ENCOUNTER — NURSE TRIAGE (OUTPATIENT)
Dept: FAMILY MEDICINE | Facility: OTHER | Age: 71
End: 2021-02-16

## 2021-02-16 DIAGNOSIS — E87.1 HYPONATREMIA: ICD-10-CM

## 2021-02-16 LAB
ANION GAP SERPL CALCULATED.3IONS-SCNC: 6 MMOL/L (ref 3–14)
BASOPHILS # BLD AUTO: 0 10E9/L (ref 0–0.2)
BASOPHILS NFR BLD AUTO: 0 %
BUN SERPL-MCNC: 13 MG/DL (ref 7–30)
CALCIUM SERPL-MCNC: 8.1 MG/DL (ref 8.5–10.1)
CHLORIDE SERPL-SCNC: 91 MMOL/L (ref 94–109)
CO2 SERPL-SCNC: 24 MMOL/L (ref 20–32)
CREAT SERPL-MCNC: 0.71 MG/DL (ref 0.52–1.04)
DIFFERENTIAL METHOD BLD: ABNORMAL
EOSINOPHIL # BLD AUTO: 0.1 10E9/L (ref 0–0.7)
EOSINOPHIL NFR BLD AUTO: 1.7 %
ERYTHROCYTE [DISTWIDTH] IN BLOOD BY AUTOMATED COUNT: 11.9 % (ref 10–15)
GFR SERPL CREATININE-BSD FRML MDRD: 85 ML/MIN/{1.73_M2}
GLUCOSE SERPL-MCNC: 99 MG/DL (ref 70–99)
HCT VFR BLD AUTO: 35 % (ref 35–47)
HGB BLD-MCNC: 12.7 G/DL (ref 11.7–15.7)
IMM GRANULOCYTES # BLD: 0 10E9/L (ref 0–0.4)
IMM GRANULOCYTES NFR BLD: 0.2 %
LABORATORY COMMENT REPORT: NORMAL
LYMPHOCYTES # BLD AUTO: 0.5 10E9/L (ref 0.8–5.3)
LYMPHOCYTES NFR BLD AUTO: 6.8 %
MCH RBC QN AUTO: 32.2 PG (ref 26.5–33)
MCHC RBC AUTO-ENTMCNC: 36.3 G/DL (ref 31.5–36.5)
MCV RBC AUTO: 89 FL (ref 78–100)
MONOCYTES # BLD AUTO: 0.4 10E9/L (ref 0–1.3)
MONOCYTES NFR BLD AUTO: 6.3 %
NEUTROPHILS # BLD AUTO: 5.7 10E9/L (ref 1.6–8.3)
NEUTROPHILS NFR BLD AUTO: 85 %
NRBC # BLD AUTO: 0 10*3/UL
NRBC BLD AUTO-RTO: 0 /100
PLATELET # BLD AUTO: 193 10E9/L (ref 150–450)
POTASSIUM SERPL-SCNC: 4.1 MMOL/L (ref 3.4–5.3)
RBC # BLD AUTO: 3.95 10E12/L (ref 3.8–5.2)
SARS-COV-2 RNA RESP QL NAA+PROBE: NEGATIVE
SODIUM SERPL-SCNC: 121 MMOL/L (ref 133–144)
SPECIMEN SOURCE: NORMAL
TROPONIN I SERPL-MCNC: <0.015 UG/L (ref 0–0.04)
WBC # BLD AUTO: 6.7 10E9/L (ref 4–11)

## 2021-02-16 PROCEDURE — 999N000157 HC STATISTIC RCP TIME EA 10 MIN

## 2021-02-16 PROCEDURE — 85025 COMPLETE CBC W/AUTO DIFF WBC: CPT | Performed by: EMERGENCY MEDICINE

## 2021-02-16 PROCEDURE — 84484 ASSAY OF TROPONIN QUANT: CPT | Performed by: EMERGENCY MEDICINE

## 2021-02-16 PROCEDURE — 36415 COLL VENOUS BLD VENIPUNCTURE: CPT | Performed by: EMERGENCY MEDICINE

## 2021-02-16 PROCEDURE — G0378 HOSPITAL OBSERVATION PER HR: HCPCS

## 2021-02-16 PROCEDURE — C9803 HOPD COVID-19 SPEC COLLECT: HCPCS

## 2021-02-16 PROCEDURE — U0005 INFEC AGEN DETEC AMPLI PROBE: HCPCS | Performed by: EMERGENCY MEDICINE

## 2021-02-16 PROCEDURE — U0003 INFECTIOUS AGENT DETECTION BY NUCLEIC ACID (DNA OR RNA); SEVERE ACUTE RESPIRATORY SYNDROME CORONAVIRUS 2 (SARS-COV-2) (CORONAVIRUS DISEASE [COVID-19]), AMPLIFIED PROBE TECHNIQUE, MAKING USE OF HIGH THROUGHPUT TECHNOLOGIES AS DESCRIBED BY CMS-2020-01-R: HCPCS | Performed by: EMERGENCY MEDICINE

## 2021-02-16 PROCEDURE — 93005 ELECTROCARDIOGRAM TRACING: CPT

## 2021-02-16 PROCEDURE — 80048 BASIC METABOLIC PNL TOTAL CA: CPT | Performed by: EMERGENCY MEDICINE

## 2021-02-16 PROCEDURE — 99285 EMERGENCY DEPT VISIT HI MDM: CPT

## 2021-02-16 PROCEDURE — 99285 EMERGENCY DEPT VISIT HI MDM: CPT | Performed by: EMERGENCY MEDICINE

## 2021-02-16 PROCEDURE — 99219 PR INITIAL OBSERVATION CARE,LEVEL II: CPT | Performed by: INTERNAL MEDICINE

## 2021-02-16 PROCEDURE — 93010 ELECTROCARDIOGRAM REPORT: CPT | Performed by: INTERNAL MEDICINE

## 2021-02-16 RX ORDER — LISINOPRIL 10 MG/1
10 TABLET ORAL DAILY
Status: DISCONTINUED | OUTPATIENT
Start: 2021-02-16 | End: 2021-02-17 | Stop reason: HOSPADM

## 2021-02-16 RX ORDER — ONDANSETRON 2 MG/ML
4 INJECTION INTRAMUSCULAR; INTRAVENOUS EVERY 6 HOURS PRN
Status: DISCONTINUED | OUTPATIENT
Start: 2021-02-16 | End: 2021-02-17 | Stop reason: HOSPADM

## 2021-02-16 RX ORDER — ACETAMINOPHEN 325 MG/1
650 TABLET ORAL EVERY 4 HOURS PRN
Status: DISCONTINUED | OUTPATIENT
Start: 2021-02-16 | End: 2021-02-17 | Stop reason: HOSPADM

## 2021-02-16 RX ORDER — ACETAMINOPHEN 650 MG/1
650 SUPPOSITORY RECTAL EVERY 4 HOURS PRN
Status: DISCONTINUED | OUTPATIENT
Start: 2021-02-16 | End: 2021-02-17 | Stop reason: HOSPADM

## 2021-02-16 RX ORDER — ONDANSETRON 4 MG/1
4 TABLET, ORALLY DISINTEGRATING ORAL EVERY 6 HOURS PRN
Status: DISCONTINUED | OUTPATIENT
Start: 2021-02-16 | End: 2021-02-17 | Stop reason: HOSPADM

## 2021-02-16 RX ORDER — ALBUTEROL SULFATE 90 UG/1
2 AEROSOL, METERED RESPIRATORY (INHALATION) EVERY 6 HOURS
Status: DISCONTINUED | OUTPATIENT
Start: 2021-02-16 | End: 2021-02-16

## 2021-02-16 RX ORDER — ALBUTEROL SULFATE 90 UG/1
2 AEROSOL, METERED RESPIRATORY (INHALATION)
Status: DISCONTINUED | OUTPATIENT
Start: 2021-02-16 | End: 2021-02-17 | Stop reason: HOSPADM

## 2021-02-16 RX ORDER — ASPIRIN 81 MG/1
81 TABLET, CHEWABLE ORAL DAILY
Status: DISCONTINUED | OUTPATIENT
Start: 2021-02-17 | End: 2021-02-17 | Stop reason: HOSPADM

## 2021-02-16 NOTE — ED TRIAGE NOTES
Pt concerned about her blood pressure. States at home it has been up to 174/101 today. Pt also stated at night she can feel her heart beating irregular when she lays down. Denies any pain or dizziness.

## 2021-02-16 NOTE — ED NOTES
Patient presents with complaints of high blood pressure. States that her pressures keep going up as she's checking them throughout the day as she states she is suppose to be monitoring her pressures as her primary is adjusting medications.

## 2021-02-16 NOTE — TELEPHONE ENCOUNTER
"Pt called, reports HTN. Also reports intermittent sensation of her heart skipping beats and beating too hard. States that this has been waking her up for the past few nights. She denies chest pain or SOB. Does report mild dizziness. Pt was advised to be evaluated in ER due to sensation of heart skipping beats. Pt verbalizes understanding.    Additional Information    Negative: Difficult to awaken or acting confused (e.g., disoriented, slurred speech)    Negative: Severe difficulty breathing (e.g., struggling for each breath, speaks in single words)    Negative: [1] Weakness of the face, arm or leg on one side of the body AND [2] new onset    Negative: [1] Numbness (i.e., loss of sensation) of the face, arm or leg on one side of the body AND [2] new onset    Negative: [1] Chest pain lasts > 5 minutes AND [2] history of heart disease  (i.e., heart attack, bypass surgery, angina, angioplasty, CHF)    Negative: [1] Chest pain AND [2] took nitrogylcerin AND [3] pain was not relieved    Negative: Sounds like a life-threatening emergency to the triager    Negative: Symptom is main concern  (e.g., headache, chest pain)    Negative: Low blood pressure is main concern    Answer Assessment - Initial Assessment Questions  1. BLOOD PRESSURE: \"What is the blood pressure?\" \"Did you take at least two measurements 5 minutes apart?\"      148/85, 166/97  2. ONSET: \"When did you take your blood pressure?\"      Around 10am and just a few mins ago  3. HOW: \"How did you obtain the blood pressure?\" (e.g., visiting nurse, automatic home BP monitor)      Home BP cuff  4. HISTORY: \"Do you have a history of high blood pressure?\"      yes  5. MEDICATIONS: \"Are you taking any medications for blood pressure?\" \"Have you missed any doses recently?\"      Yes, lisinopril. No missed doses. Takes med every AM  6. OTHER SYMPTOMS: \"Do you have any symptoms?\" (e.g., headache, chest pain, blurred vision, difficulty breathing, weakness)      Racing heart, " "can feel when laying down, denies chest pain and difficulty breathing  7. PREGNANCY: \"Is there any chance you are pregnant?\" \"When was your last menstrual period?\"      na    Protocols used: HIGH BLOOD PRESSURE-A-AH      "

## 2021-02-17 VITALS
HEART RATE: 81 BPM | SYSTOLIC BLOOD PRESSURE: 136 MMHG | RESPIRATION RATE: 18 BRPM | DIASTOLIC BLOOD PRESSURE: 70 MMHG | TEMPERATURE: 96.6 F | OXYGEN SATURATION: 97 % | BODY MASS INDEX: 30.21 KG/M2 | WEIGHT: 182.1 LBS

## 2021-02-17 LAB — SODIUM SERPL-SCNC: 131 MMOL/L (ref 133–144)

## 2021-02-17 PROCEDURE — 99217 PR OBSERVATION CARE DISCHARGE: CPT | Performed by: INTERNAL MEDICINE

## 2021-02-17 PROCEDURE — 84295 ASSAY OF SERUM SODIUM: CPT | Performed by: INTERNAL MEDICINE

## 2021-02-17 PROCEDURE — 250N000013 HC RX MED GY IP 250 OP 250 PS 637: Performed by: INTERNAL MEDICINE

## 2021-02-17 PROCEDURE — G0378 HOSPITAL OBSERVATION PER HR: HCPCS

## 2021-02-17 PROCEDURE — 36415 COLL VENOUS BLD VENIPUNCTURE: CPT | Performed by: INTERNAL MEDICINE

## 2021-02-17 RX ADMIN — LISINOPRIL 10 MG: 10 TABLET ORAL at 06:19

## 2021-02-17 NOTE — PLAN OF CARE
"/76   Pulse 71   Temp 96.5  F (35.8  C) (Tympanic)   Resp 18   Wt 82.6 kg (182 lb 1.6 oz)   SpO2 96%   BMI 30.21 kg/m      A&O, makes needs known. Independent in room. Denies pain. Pt reporting \"fluttering feeling\" in heart at beginning of shift, this is not new to patient. MD was notified by afternoon shift. Pt is very anxious about being in the hospital. HRR. Tingling and numbness in fingers reported by patient, she states it is fairly new. IV had to be removed due to it bleeding, MD was notified and IV access is no longer needed. Bed in lowest position, call light within reach.   "

## 2021-02-17 NOTE — PLAN OF CARE
Face to face report given with opportunity to observe patient.    Report given to THELMA Suarez RN   2/17/2021  7:30 AM

## 2021-02-17 NOTE — PLAN OF CARE
Patient discharged at 8:40 AM via ambulation accompanied by spouse and staff. Prescriptions - None ordered for discharge. All belongings sent with patient.     Discharge instructions reviewed with Ivonne. Listed belongings gathered and returned to patient. yes    Patient discharged to home.   Report called to NA    Core Measures and Vaccines  Core Measures applicable during stay: No. If yes, state diagnosis: NA  Pneumonia and Influenza given prior to discharge, if indicated: N/A    Surgical Patient   Surgical Procedures during stay: NO  Did patient receive discharge instruction on wound care and recognition of infection symptoms? N/A    MISC  Follow up appointment made:  Yes  Home and hospital aquired medications returned to patient: Yes  Patient reports pain was well managed at discharge: Yes

## 2021-02-17 NOTE — PLAN OF CARE
[facility] Inpatient Admission Note:    Patient admitted to 3224/3224-1 at approximately 1700 via wheel chair accompanied by transport tech from emergency room . Report received from Kaitlynn RENEE in SBAR format at 1638 via telephone. Patient ambulated to bed via self.. Patient is alert and oriented X 3, denies pain; rates at 0 on 0-10 scale.  Patient oriented to room, unit, hourly rounding, and plan of care. Explained admission packet and patient handbook with patient bill of rights brochure. Will continue to monitor and document as needed.     Inpatient Nursing criteria listed below was met:    Health care directives status obtained and documented:  patient reports having no advanced directives.    Care Everywhere authorization obtained No    MRSA swab completed for patient 65 years and older: N/A and we are not actively swabbing for MRSA during covid.    Patient identifies a surrogate decision maker: Yes If yes, who: her , Jono. Contact Information: see epic for contact number.     If initial lactic acid >2.0, repeat lactic acid drawn within 30 minutes of arrival to floor: N/A    Vaccination assessment and education completed: Yes   Vaccinations received prior to admission: Pneumovax yes  Influenza(seasonal)  YES   Vaccination(s) ordered: patent is up to date.    Clergy visit ordered if patient requests:  declines.    Skin issues/needs documented: Yes    Isolation Patient: no    Fall Prevention N/A:     Care Plan initiated: Yes    Education Documented (including assessment): Yes    Patient has discharge needs :  she's just concerned with making her covid vaccine appointment on Friday and not getting sick before then.        Patient had initially denied pain up until bedtime when she c/o leg cramps. She stated that she would use Bengay at home if she had leg cramps, she did use some she brought from home. Patient declined offer for any other pain medication. She also reported feeling like her heart was  "\"fluttering\" occasionally. HRR upon auscultation. VSS, afebrile. Oral intake is adequate. She did eat a cheeseburger and french fries for supper. Patient is on fluid restriction, we got her 3 bottles of water from kitchen so that she could visualize what she could drink in 24 hours. She wasn't given a 4th bottle of water because she wants a cup of coffee in the morning. Spoke with patient about anxiety and how it can affect her blood pressure as well as the flutter feeling she reports having occasionally, her blood pressure did come down nicely before bed. It was the lowest it's been since she's been monitoring it. Urine output adequate. She remains up independently. Patient ambulated hallways once. She is concerned about being able to get her Covid vaccine in [place] Friday. Call light within reach. IV SL.    Face to face report given with opportunity to observe patient.    Report given to Niki RENEE.    Dotty Leslie RN   2/16/2021  11:38 PM      "

## 2021-02-17 NOTE — ED PROVIDER NOTES
History     Chief Complaint   Patient presents with     Hypertension     Irregular Heart Beat     HPI  Ivonne Kirkland is a 70 year old female who resents with concern for elevated blood pressure she notes she has been taking her blood pressure all day and notes she just does not feel right.  No other specificity.  No chest pain.  No shortness of breath.  No abdominal pain.  She did recently change from hydrochlorothiazide to lisinopril.  She is concerned that she is having untoward effects.  Duration ongoing.  Character persistent.  No other associated symptoms.    Allergies:  Allergies   Allergen Reactions     Cefuroxime Other (See Comments) and Nausea     dizziness     Clindamycin Hcl Diarrhea     Codeine Sulfate Other (See Comments)     Passed out     Morphine Sulfate Nausea and Vomiting     Rofecoxib      Dizzy, weak feeling, and sick feeling.      Tramadol Other (See Comments) and Nausea       Problem List:    Patient Active Problem List    Diagnosis Date Noted     Hyponatremia 02/16/2021     Priority: Medium     Essential hypertension 02/01/2021     Priority: Medium     Chronic obstructive pulmonary disease (H) 11/10/2020     Priority: Medium        Past Medical History:    Past Medical History:   Diagnosis Date     COPD (chronic obstructive pulmonary disease) (H)        Past Surgical History:    Past Surgical History:   Procedure Laterality Date     APPENDECTOMY       CHOLECYSTECTOMY       HYSTERECTOMY TOTAL ABDOMINAL      Approach unknown- abd vs vag?     ORTHOPEDIC SURGERY      right wrist fracture     ORTHOPEDIC SURGERY      left elbow cyst     SOFT TISSUE SURGERY      I and D cysts on back and chest       Family History:    Family History   Problem Relation Age of Onset     Diabetes Mother      Pancreatic Cancer Mother 63     Diabetes Maternal Grandfather      Lung Cancer Father 59     Abdominal Aortic Aneurysm Father      Diabetes Sister      Heart Failure Sister      Diabetes Brother      Diabetes  Brother      Hypertension No family hx of      Hyperlipidemia No family hx of      Asthma No family hx of      Thyroid Disease No family hx of        Social History:  Marital Status:   [2]  Social History     Tobacco Use     Smoking status: Former Smoker     Packs/day: 1.00     Years: 42.00     Pack years: 42.00     Types: Cigarettes     Start date:      Quit date: 2019     Years since quittin.8     Smokeless tobacco: Never Used     Tobacco comment: 1/2 or 1 pack per day    Substance Use Topics     Alcohol use: Yes     Alcohol/week: 0.0 standard drinks     Comment: occassional     Drug use: No        Medications:    No current outpatient medications on file.        Review of Systems   Respiratory denies.  Cardiovascular denies.  GI denies.   denies.  Neurologic no headache.  Remainder of complete 10 point review of systems negative.    Physical Exam   BP: 165/93  Pulse: 78  Temp: 97.4  F (36.3  C)  Resp: 16  Weight: 82.6 kg (182 lb 1.6 oz)  SpO2: 99 %      Physical Exam  Constitutional:       Appearance: Normal appearance.   HENT:      Head: Normocephalic and atraumatic.      Nose: Nose normal. No congestion.      Mouth/Throat:      Mouth: Mucous membranes are moist.   Eyes:      Extraocular Movements: Extraocular movements intact.      Conjunctiva/sclera: Conjunctivae normal.      Pupils: Pupils are equal, round, and reactive to light.   Neck:      Musculoskeletal: Normal range of motion and neck supple.   Cardiovascular:      Rate and Rhythm: Normal rate.      Pulses: Normal pulses.   Pulmonary:      Effort: Pulmonary effort is normal. No respiratory distress.      Breath sounds: No wheezing.   Abdominal:      General: There is no distension.      Palpations: Abdomen is soft.      Tenderness: There is no abdominal tenderness.   Musculoskeletal: Normal range of motion.         General: No tenderness.   Skin:     General: Skin is warm and dry.   Neurological:      General: No focal deficit  present.      Mental Status: She is alert and oriented to person, place, and time.   Psychiatric:         Mood and Affect: Mood normal.       EKG read in real-time by the emergency physician shows normal sinus rhythm.  No acute ST-T wave changes.  Ventricular rate 74, MS interval 150, QRS 96.      Results for orders placed or performed during the hospital encounter of 02/16/21 (from the past 24 hour(s))   Basic metabolic panel   Result Value Ref Range    Sodium 121 (L) 133 - 144 mmol/L    Potassium 4.1 3.4 - 5.3 mmol/L    Chloride 91 (L) 94 - 109 mmol/L    Carbon Dioxide 24 20 - 32 mmol/L    Anion Gap 6 3 - 14 mmol/L    Glucose 99 70 - 99 mg/dL    Urea Nitrogen 13 7 - 30 mg/dL    Creatinine 0.71 0.52 - 1.04 mg/dL    GFR Estimate 85 >60 mL/min/[1.73_m2]    GFR Estimate If Black >90 >60 mL/min/[1.73_m2]    Calcium 8.1 (L) 8.5 - 10.1 mg/dL   CBC with platelets differential   Result Value Ref Range    WBC 6.7 4.0 - 11.0 10e9/L    RBC Count 3.95 3.8 - 5.2 10e12/L    Hemoglobin 12.7 11.7 - 15.7 g/dL    Hematocrit 35.0 35.0 - 47.0 %    MCV 89 78 - 100 fl    MCH 32.2 26.5 - 33.0 pg    MCHC 36.3 31.5 - 36.5 g/dL    RDW 11.9 10.0 - 15.0 %    Platelet Count 193 150 - 450 10e9/L    Diff Method Automated Method     % Neutrophils 85.0 %    % Lymphocytes 6.8 %    % Monocytes 6.3 %    % Eosinophils 1.7 %    % Basophils 0.0 %    % Immature Granulocytes 0.2 %    Nucleated RBCs 0 0 /100    Absolute Neutrophil 5.7 1.6 - 8.3 10e9/L    Absolute Lymphocytes 0.5 (L) 0.8 - 5.3 10e9/L    Absolute Monocytes 0.4 0.0 - 1.3 10e9/L    Absolute Eosinophils 0.1 0.0 - 0.7 10e9/L    Absolute Basophils 0.0 0.0 - 0.2 10e9/L    Abs Immature Granulocytes 0.0 0 - 0.4 10e9/L    Absolute Nucleated RBC 0.0    Troponin I   Result Value Ref Range    Troponin I ES <0.015 0.000 - 0.045 ug/L   Asymptomatic SARS-CoV-2 COVID-19 Virus (Coronavirus) by PCR    Specimen: Nasopharyngeal   Result Value Ref Range    SARS-CoV-2 Virus Specimen Source Nasopharyngeal      SARS-CoV-2 PCR Result NEGATIVE     SARS-CoV-2 PCR Comment       Testing was performed using the Xpert Xpress SARS-CoV-2 Assay on the Cepheid Gene-Xpert   Instrument Systems. Additional information about this Emergency Use Authorization (EUA)   assay can be found via the Lab Guide.         Medications   fluticasone-salmeterol (ADVAIR) 250-50 MCG/DOSE diskus inhaler 1 puff (has no administration in time range)   albuterol (PROAIR HFA/PROVENTIL HFA/VENTOLIN HFA) 108 (90 Base) MCG/ACT inhaler 2 puff (2 puffs Inhalation Not Given 2/16/21 1937)   aspirin (ASA) chewable tablet 81 mg (has no administration in time range)   lisinopril (ZESTRIL) tablet 10 mg (has no administration in time range)   acetaminophen (TYLENOL) tablet 650 mg (has no administration in time range)   acetaminophen (TYLENOL) Suppository 650 mg (has no administration in time range)   melatonin tablet 1 mg (has no administration in time range)   ondansetron (ZOFRAN-ODT) ODT tab 4 mg (has no administration in time range)     Or   ondansetron (ZOFRAN) injection 4 mg (has no administration in time range)       Assessments & Plan (with Medical Decision Making)   Patient presenting with concern for blood pressure elevated.  No evidence for target organ damage clinically though does have hyponatremia to 121.  She notes she drinks lots of water.  She notes she does not feel quite right.  In light of quite low sodium, plan of care discussed with hospitalist and plan for admission at this time.  Much thanks to hospitalist service for graciously admitting the patient.    Current Discharge Medication List          Final diagnoses:   Hyponatremia       2/16/2021   [facility]     Wayne Colon MD  02/16/21 2015

## 2021-02-17 NOTE — PLAN OF CARE
Pt has been afebrile, VS as charted.  Her O2 sats are in the upper 90's on RA, lung sounds are clear.  She has denied any pain/discomfort.  She does not have an IV access, did eat a good breakfast understands fluid restriction (reinforced), voiding adequately.  Awaiting discharge.

## 2021-02-17 NOTE — PROGRESS NOTES
Care Transitions focused note:      Brief check in with Ivonne prior to leaving the hospital.  She lives at home with her , Jono.  Sees Bela Sandhu NP for primary care.  [employer] and [facility] utilized for medications.  Denies barriers to affording basic needs.  Receptive to health care directive information, this was provided.  Denies questions or concerns.      Name: Ivonne Kirkland    MRN#: [id number]    Reason for Hospitalization: Hyponatremia [E87.1]    FLYNN: low    Discharge Date: 2/17/2021    Patient / Family response to discharge plan: agreeable    Follow-Up Appt:   Future Appointments   Date Time Provider Department Center   2/18/2021 10:30 AM NA LAB [facility]   2/24/2021 10:45 [facility]   3/17/2021 11:15 AM [facility]   4/5/2021  1:30 PM [facility]       Other Providers (Care Coordinator, County Services, PCA services etc): No    Discharge Disposition: home via , Jono.      PÉREZ Bermudez

## 2021-02-17 NOTE — DOWNTIME EVENT NOTE
The EMR was down for 1.5 hours on 2/17/2021.    Niki BENITEZ RN was responsible for completing the paper charting during this time period.     The following information was re-entered into the system by Niki Ellison RN: N/A    The following information will remain in the paper chart: N/A    Niki Ellison RN  2/17/2021

## 2021-02-17 NOTE — DISCHARGE SUMMARY
[facility]    Discharge Summary  Hospitalist    Date of Admission:  2/16/2021  Date of Discharge:  2/17/2021  Discharging Provider: Mariluz Miller MD  Date of Service (when I saw the patient): 02/17/21    Discharge Diagnoses   Active Problems:    Hyponatremia, likely due to excessive water intake (2/16/2021)  H/o COPD  [facility] HTN    History of Present Illness   Ivonne Kirkland is an 70 year old female who presented with hypertension.  Please see admission H+P for additional details.    Hospital Course   Ivonne Kirkland was admitted on 2/16/2021.  70-year-old female with history of COPD, hypertension, chronic hyponatremia who presents to the emergency department with worried about her blood pressure being too high.  During her work-up, her sodium was found to be 121, which is lower than her usual level of approximately 130.  Upon further questioning, the patient is found to be a avid water drinker, in addition she was told to avoid salt as much as possible due to her blood pressure.  This combination likely resulted in her sodium being low at this time.  She was admitted for observation, but on free water restriction, and her sodium responded nicely back up to 131.  She is stable this time for discharge home.  We will have her follow-up with her primary care physician within the week to assess for continued wellbeing and for follow-up sodium level.    Mariluz Miller MD      Significant Results and Procedures   See ablove.    Pending Results   These results will be followed up by Bela Sandhu    Unresulted Labs Ordered in the Past 30 Days of this Admission     No orders found for last 31 day(s).          Code Status   Full Code       Primary Care Physician   Bela Sandhu    Physical Exam   Temp: 96.6  F (35.9  C) Temp src: Tympanic BP: 136/70 Pulse: 81   Resp: 18 SpO2: 97 % O2 Device: None (Room air)    Vitals:    02/16/21 1702   Weight: 82.6 kg (182 lb 1.6 oz)     Vital Signs with  Ranges  Temp:  [96.5  F (35.8  C)-97.8  F (36.6  C)] 96.6  F (35.9  C)  Pulse:  [67-86] 81  Resp:  [16-20] 18  BP: (118-179)/() 136/70  SpO2:  [96 %-99 %] 97 %  I/O last 3 completed shifts:  In: 300 [P.O.:300]  Out: 2400 [Urine:2400]    Constitutional: AA, NAD  Eyes: PERRLA, no injection, no icterus  HEENT: atraumatic, normocephalic  Respiratory: CTA b/l  Cardiovascular: S1 S2 RRR  GI: soft, NT, ND, + bowel sounds  Lymph/Hematologic: no palpable lymphadenopathy  Skin: no rashes, no lesions  Musculoskeletal: No edema, good tone, no deformities  Neurologic: oriented x 3, no focal deficits  Psychiatric: appropriate affect    Discharge Disposition   Discharged to home  Condition at discharge: Stable    Consultations This Hospital Stay   None    Time Spent on this Encounter   IMariluz MD, personally saw the patient today and spent greater than 30 minutes discharging this patient.    Discharge Orders      Reason for your hospital stay    hyponatremia     Follow-up and recommended labs and tests     Follow up with primary care provider, Bela Sandhu, within 7 days for hospital follow- up.  The following labs/tests are recommended: Na.     Activity    Your activity upon discharge: activity as tolerated     Full Code     Diet    Follow this diet upon discharge: Orders Placed This Encounter      Regular Diet Adult     Discharge Medications   Current Discharge Medication List      CONTINUE these medications which have NOT CHANGED    Details   ADVAIR DISKUS 250-50 MCG/DOSE inhaler Inhale 1 puff into the lungs 2 times daily      albuterol (PROAIR HFA/PROVENTIL HFA/VENTOLIN HFA) 108 (90 Base) MCG/ACT inhaler Inhale 2 puffs into the lungs every 6 hours  Qty: 18 g, Refills: 0    Comments: Pharmacy may dispense brand covered by insurance (Proair, or proventil or ventolin or generic albuterol inhaler)  Associated Diagnoses: Chronic obstructive pulmonary disease, unspecified COPD type (H)      calcium carbonate  600 mg-vitamin D 400 units (CALTRATE) 600-400 MG-UNIT per tablet Take 1 tablet by mouth daily      lisinopril (ZESTRIL) 10 MG tablet Take 1 tablet (10 mg) by mouth daily  Qty: 30 tablet, Refills: 1    Associated Diagnoses: Essential hypertension      ASPIRIN PO Take 81 mg by mouth daily.      Diclofenac Sodium (VOLTAREN EX)       FISH OIL-KRILL OIL PO       Homeopathic Products (THERAWORX RELIEF) FOAM            Allergies   Allergies   Allergen Reactions     Cefuroxime Other (See Comments) and Nausea     dizziness     Clindamycin Hcl Diarrhea     Codeine Sulfate Other (See Comments)     Passed out     Morphine Sulfate Nausea and Vomiting     Rofecoxib      Dizzy, weak feeling, and sick feeling.      Tramadol Other (See Comments) and Nausea     Data   Most Recent 3 CBC's:  Recent Labs   Lab Test 02/16/21  1504 02/09/21  1837 09/30/20  0950   WBC 6.7 5.4 6.4   HGB 12.7 13.3 14.0   MCV 89 89 92    208 237      Most Recent 3 BMP's:  Recent Labs   Lab Test 02/17/21  0536 02/16/21  1504 02/11/21  1249 02/09/21  1837   * 121* 126* 125*   POTASSIUM  --  4.1 4.1 3.4   CHLORIDE  --  91* 93* 91*   CO2  --  24 29 28   BUN  --  13 16 15   CR  --  0.71 0.79 0.71   ANIONGAP  --  6 4 6   TEN  --  8.1* 8.5 8.4*   GLC  --  99 91 110*     Most Recent 2 LFT's:  Recent Labs   Lab Test 09/30/20  0950 08/17/20 03/05/19  1630 03/05/19  1630   AST 19 24   < > 14   ALT 25 18   < > 19   ALKPHOS 113  --   --  100   BILITOTAL 0.5  --   --  0.3    < > = values in this interval not displayed.     Most Recent INR's and Anticoagulation Dosing History:  Anticoagulation Dose History     There is no flowsheet data to display.        Most Recent 3 Troponin's:  Recent Labs   Lab Test 02/16/21  1504 03/05/19  1932 03/05/19  1630   TROPI <0.015 <0.015 <0.015     Most Recent Cholesterol Panel:  Recent Labs   Lab Test 09/30/20  0950   CHOL 213*   *   HDL 74   TRIG 88     Most Recent 6 Bacteria Isolates From Any Culture (See EPIC Reports  for Culture Details):No lab results found.  Most Recent TSH, T4 and A1c Labs:  Recent Labs   Lab Test 09/30/20  0950 08/17/20   TSH 1.47 1.266   A1C  --  5.2

## 2021-02-18 ENCOUNTER — TELEPHONE (OUTPATIENT)
Dept: FAMILY MEDICINE | Facility: OTHER | Age: 71
End: 2021-02-18

## 2021-02-18 DIAGNOSIS — E87.1 HYPONATREMIA: ICD-10-CM

## 2021-02-18 LAB
ANION GAP SERPL CALCULATED.3IONS-SCNC: 4 MMOL/L (ref 3–14)
BUN SERPL-MCNC: 25 MG/DL (ref 7–30)
CALCIUM SERPL-MCNC: 8.7 MG/DL (ref 8.5–10.1)
CHLORIDE SERPL-SCNC: 106 MMOL/L (ref 94–109)
CO2 SERPL-SCNC: 27 MMOL/L (ref 20–32)
CREAT SERPL-MCNC: 0.99 MG/DL (ref 0.52–1.04)
GFR SERPL CREATININE-BSD FRML MDRD: 57 ML/MIN/{1.73_M2}
GLUCOSE SERPL-MCNC: 95 MG/DL (ref 70–99)
POTASSIUM SERPL-SCNC: 4.4 MMOL/L (ref 3.4–5.3)
SODIUM SERPL-SCNC: 137 MMOL/L (ref 133–144)

## 2021-02-18 PROCEDURE — 80048 BASIC METABOLIC PNL TOTAL CA: CPT | Mod: ZL | Performed by: NURSE PRACTITIONER

## 2021-02-18 PROCEDURE — 36415 COLL VENOUS BLD VENIPUNCTURE: CPT | Mod: ZL | Performed by: NURSE PRACTITIONER

## 2021-02-18 NOTE — TELEPHONE ENCOUNTER
Attempted to make follow up phone call with patient. Unable to reach patient at this time. First attempt. Will attempt again.

## 2021-02-22 NOTE — PROGRESS NOTES
Assessment & Plan     (E87.1) Hyponatremia  (primary encounter diagnosis)  Comment: check lab  Plan: Basic metabolic panel            (I10) Essential hypertension  Comment: check lab. RF  Plan: Basic metabolic panel, lisinopril (ZESTRIL) 10         MG tablet               See Patient Instructions    Return in about 3 months (around 5/24/2021) for BP check.    Bela Sandhu NP  [facility]         Hospital Follow-up Visit:    Hospital/Nursing Home/ Rehab Facility: [facility]  Date of Admission: 2/16  Date of Discharge: 2/17  Reason(s) for Admission: hyponatremia       Was your hospitalization related to COVID-19? No   Problems taking medications regularly:  Stopped taking calcium carb-vit d. And fish oil .   Medication changes since discharge: None  Problems adhering to non-medication therapy:  Fluid restriction. Patient said that the specialist told her to eat salt in hospital .     Summary of hospitalization:  [facility] discharge summary reviewed  Diagnostic Tests/Treatments reviewed.  Follow up needed: none  Other Healthcare Providers Involved in Patient s Care:         None  Update since discharge: improved. Post Discharge Medication Reconciliation: discharge medications reconciled, continue medications without change.  Plan of care communicated with patient            Review of Systems   Constitutional, HEENT, cardiovascular, pulmonary, gi and gu systems are negative, except as otherwise noted.      Objective    /84 (BP Location: Right arm, Patient Position: Sitting, Cuff Size: Adult Regular)   Pulse 79   Temp 98  F (36.7  C)   Wt 78.9 kg (174 lb)   SpO2 98%   BMI 28.87 kg/m    Body mass index is 28.87 kg/m .  Physical Exam   GENERAL: healthy, alert and no distress  NECK: no adenopathy, no asymmetry, masses, or scars and thyroid normal to palpation  RESP: lungs clear to auscultation - no rales, rhonchi or wheezes  CV:  regular rate and rhythm, normal S1 S2, no S3 or S4, no murmur, click or rub, no peripheral edema and peripheral pulses strong  MS: no gross musculoskeletal defects noted, no edema  SKIN: no suspicious lesions or rashes  NEURO: Normal strength and tone, mentation intact and speech normal  PSYCH: mentation appears normal, affect normal/bright    Results for orders placed or performed in visit on 02/24/21 (from the past 24 hour(s))   Basic metabolic panel   Result Value Ref Range    Sodium 138 133 - 144 mmol/L    Potassium 4.6 3.4 - 5.3 mmol/L    Chloride 108 94 - 109 mmol/L    Carbon Dioxide 26 20 - 32 mmol/L    Anion Gap 4 3 - 14 mmol/L    Glucose 91 70 - 99 mg/dL    Urea Nitrogen 26 7 - 30 mg/dL    Creatinine 0.88 0.52 - 1.04 mg/dL    GFR Estimate 66 >60 mL/min/[1.73_m2]    GFR Estimate If Black 76 >60 mL/min/[1.73_m2]    Calcium 8.8 8.5 - 10.1 mg/dL

## 2021-02-23 ENCOUNTER — TELEPHONE (OUTPATIENT)
Dept: CASE MANAGEMENT | Facility: HOSPITAL | Age: 71
End: 2021-02-23

## 2021-02-24 ENCOUNTER — OFFICE VISIT (OUTPATIENT)
Dept: FAMILY MEDICINE | Facility: OTHER | Age: 71
End: 2021-02-24
Attending: NURSE PRACTITIONER
Payer: MEDICARE

## 2021-02-24 VITALS
HEART RATE: 79 BPM | WEIGHT: 174 LBS | OXYGEN SATURATION: 98 % | BODY MASS INDEX: 28.87 KG/M2 | TEMPERATURE: 98 F | DIASTOLIC BLOOD PRESSURE: 84 MMHG | SYSTOLIC BLOOD PRESSURE: 132 MMHG

## 2021-02-24 DIAGNOSIS — E87.1 HYPONATREMIA: Primary | ICD-10-CM

## 2021-02-24 DIAGNOSIS — I10 ESSENTIAL HYPERTENSION: ICD-10-CM

## 2021-02-24 LAB
ANION GAP SERPL CALCULATED.3IONS-SCNC: 4 MMOL/L (ref 3–14)
BUN SERPL-MCNC: 26 MG/DL (ref 7–30)
CALCIUM SERPL-MCNC: 8.8 MG/DL (ref 8.5–10.1)
CHLORIDE SERPL-SCNC: 108 MMOL/L (ref 94–109)
CO2 SERPL-SCNC: 26 MMOL/L (ref 20–32)
CREAT SERPL-MCNC: 0.88 MG/DL (ref 0.52–1.04)
GFR SERPL CREATININE-BSD FRML MDRD: 66 ML/MIN/{1.73_M2}
GLUCOSE SERPL-MCNC: 91 MG/DL (ref 70–99)
POTASSIUM SERPL-SCNC: 4.6 MMOL/L (ref 3.4–5.3)
SODIUM SERPL-SCNC: 138 MMOL/L (ref 133–144)

## 2021-02-24 PROCEDURE — 36415 COLL VENOUS BLD VENIPUNCTURE: CPT | Mod: ZL | Performed by: NURSE PRACTITIONER

## 2021-02-24 PROCEDURE — 80048 BASIC METABOLIC PNL TOTAL CA: CPT | Mod: ZL | Performed by: NURSE PRACTITIONER

## 2021-02-24 PROCEDURE — 99214 OFFICE O/P EST MOD 30 MIN: CPT | Performed by: NURSE PRACTITIONER

## 2021-02-24 PROCEDURE — G0463 HOSPITAL OUTPT CLINIC VISIT: HCPCS

## 2021-02-24 PROCEDURE — 99496 TRANSJ CARE MGMT HIGH F2F 7D: CPT | Performed by: NURSE PRACTITIONER

## 2021-02-24 RX ORDER — LISINOPRIL 10 MG/1
10 TABLET ORAL DAILY
Qty: 90 TABLET | Refills: 1 | Status: SHIPPED | OUTPATIENT
Start: 2021-02-24 | End: 2021-07-15

## 2021-02-24 ASSESSMENT — PAIN SCALES - GENERAL: PAINLEVEL: NO PAIN (0)

## 2021-02-24 NOTE — NURSING NOTE
"Chief Complaint   Patient presents with     Hospital F/U       Initial /84 (BP Location: Right arm, Patient Position: Sitting, Cuff Size: Adult Regular)   Pulse 79   Temp 98  F (36.7  C)   Wt 78.9 kg (174 lb)   SpO2 98%   BMI 28.87 kg/m   Estimated body mass index is 28.87 kg/m  as calculated from the following:    Height as of 8/28/20: 1.654 m (5' 5.1\").    Weight as of this encounter: 78.9 kg (174 lb).  Medication Reconciliation: complete  Radha Espino  "

## 2021-02-24 NOTE — PATIENT INSTRUCTIONS
Patient Education     Controlling High Blood Pressure  High blood pressure (hypertension) is often called the silent killer. This is because many people who have it, don t know it. It can be very dangerous. High blood pressure can raise your risk of heart attack, stroke, heart disease, and heart failure. Controlling your blood pressure can decrease your risk of these problems. It's important to know the appropriate blood pressure range and remember to check your blood pressure regularly. Doing so can save your life.  Blood pressure measurements are given as 2 numbers. Systolic blood pressure is the upper number. This is the pressure when the heart contracts. Diastolic blood pressure is the lower number. This is the pressure when the heart relaxes between beats.  Blood pressure is categorized as normal, elevated, or stage 1 or stage 2 high blood pressure:    Normal blood pressure is systolic of less than 120 and diastolic of less than 80 (120/80)    Elevated blood pressure is systolic of 120 to 129 and diastolic less than 80    Stage 1 high blood pressure is systolic of 130 to 139 or diastolic between 80 to 89    Stage 2 high blood pressure is when systolic is 140 or higher or the diastolic is 90 or higher  A heart-healthy lifestyle can help you control your blood pressure without medicines. Here are some things you can do to pursue a heart-healthy lifestyle:    Choose heart-healthy foods    Select low-salt, low-fat foods. Limit sodium intake to 2,400 mg per day or the amount suggested by your healthcare provider.    Limit canned, dried, cured, packaged, and fast foods. These can contain a lot of salt.    Eat 8 to 10 servings of fruits and vegetables every day.    Choose lean meats, fish, or chicken.    Eat whole-grain pasta, brown rice, and beans.    Eat 2 to 3 servings of low-fat or fat-free dairy products.    Ask your doctor about the DASH eating plan. This plan helps reduce blood pressure.    When you go to a  restaurant, ask that your meal be prepared with no added salt.    Stay at a healthy weight    Ask your healthcare provider how many calories to eat a day. Then stick to that number.    Ask your healthcare provider what weight range is healthiest for you. If you are overweight, a weight loss of only 3% to 5% of your body weight can help lower blood pressure. Generally, a good weight loss goal is to lose 10% of your body weight in a year.    Limit snacks and sweets.    Get regular exercise.    Get up and get active    Find activities you enjoy that can be done alone or with friends or family. Such activities might include bicycling, dancing, walking, or jogging.    Park farther away from building entrances to walk more.    Use stairs instead of the elevator.    When you can, walk or bike instead of driving.    [place] leaves, garden, or do household repairs.    Be active at a moderate to vigorous level of physical activity for at least 40 minutes for a minimum of 3 to 4 days a week.     Manage stress    Make time to relax and enjoy life. Find time to laugh.    Communicate your concerns with your loved ones and your healthcare provider.    Visit with family and friends, and keep up with hobbies.    Limit alcohol and quit smoking    Men should have no more than 2 drinks per day.    Women should have no more than 1 drink per day.    Talk with your healthcare provider about quitting smoking. Smoking significantly increases your risk for heart disease and stroke. Ask your healthcare provider about community smoking cessation programs and other options.    Medicines  If lifestyle changes aren t enough, your healthcare provider may prescribe high blood pressure medicine. Take all medicines as prescribed. If you have any questions about your medicines, ask your healthcare provider before stopping or changing them.  [employer] last reviewed this educational content on 6/1/2019 2000-2020 [employer]. [address] [place] [address]  [place] [address]. All rights reserved. This information is not intended as a substitute for professional medical care. Always follow your healthcare professional's instructions.

## 2021-03-24 ENCOUNTER — HOSPITAL ENCOUNTER (EMERGENCY)
Facility: HOSPITAL | Age: 71
Discharge: HOME | End: 2021-03-24
Attending: NURSE PRACTITIONER | Admitting: NURSE PRACTITIONER
Payer: MEDICARE

## 2021-03-24 VITALS
TEMPERATURE: 97.6 F | DIASTOLIC BLOOD PRESSURE: 90 MMHG | OXYGEN SATURATION: 97 % | RESPIRATION RATE: 18 BRPM | HEART RATE: 82 BPM | SYSTOLIC BLOOD PRESSURE: 170 MMHG

## 2021-03-24 DIAGNOSIS — L50.9 URTICARIA: Primary | ICD-10-CM

## 2021-03-24 PROCEDURE — G0463 HOSPITAL OUTPT CLINIC VISIT: HCPCS

## 2021-03-24 PROCEDURE — 250N000013 HC RX MED GY IP 250 OP 250 PS 637: Mod: GY | Performed by: NURSE PRACTITIONER

## 2021-03-24 PROCEDURE — 99214 OFFICE O/P EST MOD 30 MIN: CPT | Performed by: NURSE PRACTITIONER

## 2021-03-24 RX ORDER — FAMOTIDINE 20 MG/1
20 TABLET, FILM COATED ORAL ONCE
Status: COMPLETED | OUTPATIENT
Start: 2021-03-24 | End: 2021-03-24

## 2021-03-24 RX ORDER — FAMOTIDINE 20 MG/1
20 TABLET, FILM COATED ORAL DAILY
Qty: 7 TABLET | Refills: 0 | Status: SHIPPED | OUTPATIENT
Start: 2021-03-24 | End: 2021-03-31

## 2021-03-24 RX ORDER — DIPHENHYDRAMINE HCL 25 MG
25 CAPSULE ORAL ONCE
Status: COMPLETED | OUTPATIENT
Start: 2021-03-24 | End: 2021-03-24

## 2021-03-24 RX ADMIN — DIPHENHYDRAMINE HYDROCHLORIDE 25 MG: 25 CAPSULE ORAL at 18:58

## 2021-03-24 RX ADMIN — FAMOTIDINE 20 MG: 20 TABLET, FILM COATED ORAL at 19:24

## 2021-03-24 ASSESSMENT — ENCOUNTER SYMPTOMS: ROS SKIN COMMENTS: ITCHY

## 2021-03-24 NOTE — ED TRIAGE NOTES
Patient presents with complaints of hives, states they are all over. Denies using any benadryl and denies any new medications or food. States she did have a handful of nuts earlier, but always has nuts. Does endorse her second COVID vaccine yesterday though. She did not take benadryl and denies any SOB

## 2021-03-25 NOTE — DISCHARGE INSTRUCTIONS
Take Benadryl.  Take Pepcid as prescribed.    Schedule an appointment with your doctor for reevaluation as needed in 2 to 3 days.    Return to emergency department for worsening or concerning symptoms.

## 2021-03-25 NOTE — ED PROVIDER NOTES
History     Chief Complaint   Patient presents with     Hives     HPI  Ivonne Kirkland is a 70 year old female who presents to urgent care with her spouse for concerns of allergic reaction.  Patient had her 2nd Moderna covid19 vaccination yesterday around 10 AM.  She had pain to the site of vaccination and very mild itchiness.  Today, starting around 2-3 PM she noticed hives all over her body as well as increased itching.  Denies throat discomfort, chest pain or chest tightness.  History of COPD and reports chronic shortness of breath which has not changed.  No other new medications, foods, clothing.  Patient states she ate some mixed nuts earlier today but notes she does NOT have a history of a nut allergy.  She has not taken anything for her symptoms.    Allergies:  Allergies   Allergen Reactions     Cefuroxime Other (See Comments) and Nausea     dizziness     Clindamycin Hcl Diarrhea     Codeine Sulfate Other (See Comments)     Passed out     Morphine Sulfate Nausea and Vomiting     Rofecoxib      Dizzy, weak feeling, and sick feeling.      Tramadol Other (See Comments) and Nausea       Problem List:    Patient Active Problem List    Diagnosis Date Noted     Hyponatremia 02/16/2021     Priority: Medium     Essential hypertension 02/01/2021     Priority: Medium     Chronic obstructive pulmonary disease (H) 11/10/2020     Priority: Medium        Past Medical History:    Past Medical History:   Diagnosis Date     COPD (chronic obstructive pulmonary disease) (H)        Past Surgical History:    Past Surgical History:   Procedure Laterality Date     APPENDECTOMY       CHOLECYSTECTOMY       HYSTERECTOMY TOTAL ABDOMINAL      Approach unknown- abd vs vag?     ORTHOPEDIC SURGERY      right wrist fracture     ORTHOPEDIC SURGERY      left elbow cyst     SOFT TISSUE SURGERY      I and D cysts on back and chest       Family History:    Family History   Problem Relation Age of Onset     Diabetes Mother      Pancreatic Cancer  Mother 63     Diabetes Maternal Grandfather      Lung Cancer Father 59     Abdominal Aortic Aneurysm Father      Diabetes Sister      Heart Failure Sister      Diabetes Brother      Diabetes Brother      Hypertension No family hx of      Hyperlipidemia No family hx of      Asthma No family hx of      Thyroid Disease No family hx of        Social History:  Marital Status:   [2]  Social History     Tobacco Use     Smoking status: Former Smoker     Packs/day: 1.00     Years: 42.00     Pack years: 42.00     Types: Cigarettes     Start date:      Quit date: 2019     Years since quittin.9     Smokeless tobacco: Never Used     Tobacco comment: 1/2 or 1 pack per day    Substance Use Topics     Alcohol use: Yes     Alcohol/week: 0.0 standard drinks     Comment: occassional     Drug use: No        Medications:    famotidine (PEPCID) 20 MG tablet  ADVAIR DISKUS 250-50 MCG/DOSE inhaler  albuterol (PROAIR HFA/PROVENTIL HFA/VENTOLIN HFA) 108 (90 Base) MCG/ACT inhaler  ASPIRIN PO  calcium carbonate 600 mg-vitamin D 400 units (CALTRATE) 600-400 MG-UNIT per tablet  Diclofenac Sodium (VOLTAREN EX)  FISH OIL-KRILL OIL PO  Homeopathic Products (THERAWORX RELIEF) FOAM  lisinopril (ZESTRIL) 10 MG tablet          Review of Systems   Skin: Positive for rash.        Itchy   All other systems reviewed and are negative.      Physical Exam   BP: 170/90  Pulse: 82  Temp: 97.6  F (36.4  C)  Resp: 18  SpO2: 97 %      Physical Exam  Vitals signs and nursing note reviewed.   Constitutional:       Appearance: Normal appearance. She is not ill-appearing or toxic-appearing.   HENT:      Head: Normocephalic.   Eyes:      Pupils: Pupils are equal, round, and reactive to light.   Neck:      Musculoskeletal: Neck supple.   Cardiovascular:      Rate and Rhythm: Normal rate and regular rhythm.      Heart sounds: Normal heart sounds.   Pulmonary:      Effort: Pulmonary effort is normal.      Breath sounds: Normal breath sounds.    Abdominal:      Palpations: Abdomen is soft.      Tenderness: There is no abdominal tenderness.   Skin:     General: Skin is warm and dry.      Capillary Refill: Capillary refill takes less than 2 seconds.      Findings: Rash present. Rash is urticarial.      Comments: Scattered urticarial rash to torso.   Neurological:      Mental Status: She is alert and oriented to person, place, and time.         ED Course        Procedures               No results found for this or any previous visit (from the past 24 hour(s)).    Medications   diphenhydrAMINE (BENADRYL) capsule 25 mg (25 mg Oral Given 3/24/21 1858)   famotidine (PEPCID) tablet 20 mg (20 mg Oral Given 3/24/21 1924)       Assessments & Plan (with Medical Decision Making)     I have reviewed the nursing notes.    I have reviewed the findings, diagnosis, plan and need for follow up with the patient.  70-year-old female that presented with concerns of hives and itchiness possibly related to second dose of Moderna COVID-19 vaccination.  Respirations nonlabored with oxygen saturation 97% on room air.  Patient does have scattered urticarial rash to upper body.  No oral swelling.  Vital signs are stable.  Patient was given Benadryl and Pepcid in urgent care.  Advised her to continue taking Benadryl.  Pepcid as prescribed.  Close follow-up with PCP as needed if no improvement in symptoms.  Return to ED/UC for worsening or concerning symptoms.    This document was prepared using a combination of typing and voice generated software.  While every attempt was made for accuracy, spelling and grammatical errors may exist.    Discharge Medication List as of 3/24/2021  7:29 PM      START taking these medications    Details   famotidine (PEPCID) 20 MG tablet Take 1 tablet (20 mg) by mouth daily for 7 days, Disp-7 tablet, R-0, E-Prescribe             Final diagnoses:   Urticaria       3/24/2021   [facility]     Mpofu, Prudence, CNP  03/26/21 0937

## 2021-04-05 ENCOUNTER — HOSPITAL ENCOUNTER (OUTPATIENT)
Dept: CT IMAGING | Facility: HOSPITAL | Age: 71
Discharge: HOME | End: 2021-04-05
Attending: NURSE PRACTITIONER | Admitting: NURSE PRACTITIONER
Payer: MEDICARE

## 2021-04-05 DIAGNOSIS — R19.01 RUQ ABDOMINAL MASS: ICD-10-CM

## 2021-04-05 LAB
CREAT BLD-MCNC: 1.2 MG/DL (ref 0.52–1.04)
GFR SERPL CREATININE-BSD FRML MDRD: 44 ML/MIN/{1.73_M2}

## 2021-04-05 PROCEDURE — 82565 ASSAY OF CREATININE: CPT

## 2021-04-05 PROCEDURE — 74177 CT ABD & PELVIS W/CONTRAST: CPT | Mod: MG

## 2021-04-05 PROCEDURE — 255N000002 HC RX 255 OP 636: Performed by: RADIOLOGY

## 2021-04-05 RX ORDER — IOPAMIDOL 612 MG/ML
100 INJECTION, SOLUTION INTRAVASCULAR ONCE
Status: COMPLETED | OUTPATIENT
Start: 2021-04-05 | End: 2021-04-05

## 2021-04-05 RX ADMIN — IOPAMIDOL 100 ML: 612 INJECTION, SOLUTION INTRAVENOUS at 13:28

## 2021-04-05 NOTE — PROGRESS NOTES
"Ivonne is a 70 year old who is being evaluated via a billable telephone visit.      What phone number would you like to be contacted at? [phone]  How would you like to obtain your AVS? Mail a copy    Assessment & Plan      Discussed CT results and she verbalized understanding. Referral placed to [facility] in [place], [place].     Discussed that she can take Zyrtec for her allergies.     She asked if she can take ASA with Lisinopril. She should take ASA for CV health. Continue Lisinopril also.     (K31.89) Gastric mass  (primary encounter diagnosis)  Comment: results discussed. Referral to GI at [facility] in [place], [place]  Plan: GASTROENTEROLOGY ADULT REF CONSULT ONLY            BMI:   Estimated body mass index is 28.87 kg/m  as calculated from the following:    Height as of 8/28/20: 1.654 m (5' 5.1\").    Weight as of 2/24/21: 78.9 kg (174 lb).       See Patient Instructions    Return if symptoms worsen or fail to improve.    Bela Sandhu, NP  [facility]    Subjective   Ivonne is a 70 year old who presents for the following health issues     HPI     CT Results Abdomen/pelvis      Review of Systems   Constitutional, HEENT, cardiovascular, pulmonary, gi and gu systems are negative, except as otherwise noted.      Objective           Vitals:  No vitals were obtained today due to virtual visit.    Physical Exam   healthy, alert and no distress  PSYCH: Alert and oriented times 3; coherent speech, normal   rate and volume, able to articulate logical thoughts, able   to abstract reason, no tangential thoughts, no hallucinations   or delusions  Her affect is normal  RESP: No cough, no audible wheezing, able to talk in full sentences  Remainder of exam unable to be completed due to telephone visits    Results for orders placed or performed during the hospital encounter of 04/05/21 (from the past 24 hour(s))   Creatinine POCT   Result Value Ref Range    Creatinine 1.2 (H) 0.52 - 1.04 mg/dL    GFR " Estimate 44 (L) >60 mL/min/[1.73_m2]    GFR Estimate If Black 54 (L) >60 mL/min/[1.73_m2]   CT Abdomen Pelvis w Contrast    Narrative    PROCEDURE: CT ABDOMEN PELVIS W CONTRAST 4/5/2021 1:35 PM    HISTORY: RUQ abdominal mass    COMPARISONS: Ultrasound of the abdominal aorta dated the 11/6/2020.    Meds/Dose Given: ISOVUE 300  100ML    TECHNIQUE: Axial postcontrast enhanced images with coronal and  sagittal reformatted images.    FINDINGS: There is a large fairly well-circumscribed mass in the  region of the gastric antrum. This measures 6.5 x 6.2 cm as measured  on axial images. This is relatively exophytic and arises from the  greater curvature of the antrum. There is some compression of the  lumen in the superior portion of this mass. There does not appear to  be a large amount of debris in the stomach.    Lung bases are relatively clear.    No focal abnormality is seen in the liver, spleen, adrenal glands or  in the pancreas. Gallbladder is not well visualized but no clips are  seen.    No solid renal mass is seen.    No bowel abnormality is seen except for some sigmoid diverticulosis.  No abscess or fluid collection is seen.    There are mildly prominent lymph nodes in the upper abdomen without  definite enlarged lymph nodes.    No aneurysm is seen. There is degenerative change in the spine without  focal lesion.         Impression    IMPRESSION: Large somewhat exophytic solid appearing mass arising from  the gastric antrum. Differential considerations would include  gastrointestinal stromal tumor, adenocarcinoma or lymphoma. No liver  metastases are seen and there is only mild prominence of surrounding  lymph nodes.    This positive result was called to Bela Sandhu by Dr. Hanna Trimble on 4/5/2021 at 1410 hours.    HANNA TRIMBLE MD       Phone call duration: 7 minutes    Start: 08:31  End: 08:38

## 2021-04-06 ENCOUNTER — VIRTUAL VISIT (OUTPATIENT)
Dept: FAMILY MEDICINE | Facility: OTHER | Age: 71
End: 2021-04-06
Attending: NURSE PRACTITIONER
Payer: MEDICARE

## 2021-04-06 DIAGNOSIS — K31.89 GASTRIC MASS: Primary | ICD-10-CM

## 2021-04-06 PROCEDURE — 99441 PR PHYSICIAN TELEPHONE EVALUATION 5-10 MIN: CPT | Mod: 95 | Performed by: NURSE PRACTITIONER

## 2021-04-12 ENCOUNTER — TRANSFERRED RECORDS (OUTPATIENT)
Dept: HEALTH INFORMATION MANAGEMENT | Facility: CLINIC | Age: 71
End: 2021-04-12

## 2021-04-20 NOTE — PROGRESS NOTES
Assessment & Plan     (C88.4) MALT lymphoma (H)  (primary encounter diagnosis)  Comment: check labs (potassium and magnesium) per [facility] gastro. Fax results  Plan: Basic metabolic panel, Magnesium            (Z71.2) Encounter to discuss test results  Comment: biopsy result discussed with opportunity to ask questions  Plan: questions answered to the best of my ability. She is having PET scan tomorrow in [place]. She is scheduled to see oncologist at [facility]. Valium 5 mg ordered for PET scan with history of claustrophobia        See Patient Instructions    Return if symptoms worsen or fail to improve.    Bela Sandhu NP  [facility]    Jennifer Coronado is a 71 year old who presents for the following health issues       HPI     Follow up biopsy April 5th,21   Potassium and Magnesium follow up  Pet scan tomorrow in [place]   Oncology 4/26/21  H. Pylori identified and is being treated    Review of Systems   Constitutional, HEENT, cardiovascular, pulmonary, gi and gu systems are negative, except as otherwise noted.      Objective    /72 (BP Location: Right arm, Patient Position: Sitting, Cuff Size: Adult Regular)   Pulse 78   Temp 98.2  F (36.8  C) (Tympanic)   Wt 79.8 kg (176 lb)   SpO2 98%   BMI 29.20 kg/m    Body mass index is 29.2 kg/m .  Physical Exam   GENERAL: healthy, alert and no distress  NECK: no adenopathy, no asymmetry, masses, or scars and thyroid normal to palpation  RESP: lungs clear to auscultation - no rales, rhonchi or wheezes  CV: regular rate and rhythm, normal S1 S2, no S3 or S4, no murmur, click or rub, no peripheral edema and peripheral pulses strong  MS: no gross musculoskeletal defects noted, no edema  SKIN: no suspicious lesions or rashes  NEURO: Normal strength and tone, mentation intact and speech normal  PSYCH: mentation appears normal, affect normal/bright    Results for orders placed or performed in visit on 04/21/21   Basic metabolic  panel     Status: Abnormal   Result Value Ref Range    Sodium 133 133 - 144 mmol/L    Potassium 4.6 3.4 - 5.3 mmol/L    Chloride 103 94 - 109 mmol/L    Carbon Dioxide 26 20 - 32 mmol/L    Anion Gap 4 3 - 14 mmol/L    Glucose 95 70 - 99 mg/dL    Urea Nitrogen 21 7 - 30 mg/dL    Creatinine 0.99 0.52 - 1.04 mg/dL    GFR Estimate 57 (L) >60 mL/min/[1.73_m2]    GFR Estimate If Black 66 >60 mL/min/[1.73_m2]    Calcium 8.8 8.5 - 10.1 mg/dL   Magnesium     Status: None   Result Value Ref Range    Magnesium 2.2 1.6 - 2.3 mg/dL

## 2021-04-21 ENCOUNTER — OFFICE VISIT (OUTPATIENT)
Dept: FAMILY MEDICINE | Facility: OTHER | Age: 71
End: 2021-04-21
Attending: NURSE PRACTITIONER
Payer: MEDICARE

## 2021-04-21 ENCOUNTER — TELEPHONE (OUTPATIENT)
Dept: FAMILY MEDICINE | Facility: OTHER | Age: 71
End: 2021-04-21

## 2021-04-21 VITALS
SYSTOLIC BLOOD PRESSURE: 138 MMHG | TEMPERATURE: 98.2 F | HEART RATE: 78 BPM | DIASTOLIC BLOOD PRESSURE: 72 MMHG | WEIGHT: 176 LBS | OXYGEN SATURATION: 98 % | BODY MASS INDEX: 29.2 KG/M2

## 2021-04-21 DIAGNOSIS — Z71.2 ENCOUNTER TO DISCUSS TEST RESULTS: ICD-10-CM

## 2021-04-21 DIAGNOSIS — C88.40 MALT LYMPHOMA: Primary | ICD-10-CM

## 2021-04-21 DIAGNOSIS — F41.9 ANXIETY: Primary | ICD-10-CM

## 2021-04-21 LAB
ANION GAP SERPL CALCULATED.3IONS-SCNC: 4 MMOL/L (ref 3–14)
BUN SERPL-MCNC: 21 MG/DL (ref 7–30)
CALCIUM SERPL-MCNC: 8.8 MG/DL (ref 8.5–10.1)
CHLORIDE SERPL-SCNC: 103 MMOL/L (ref 94–109)
CO2 SERPL-SCNC: 26 MMOL/L (ref 20–32)
CREAT SERPL-MCNC: 0.99 MG/DL (ref 0.52–1.04)
GFR SERPL CREATININE-BSD FRML MDRD: 57 ML/MIN/{1.73_M2}
GLUCOSE SERPL-MCNC: 95 MG/DL (ref 70–99)
MAGNESIUM SERPL-MCNC: 2.2 MG/DL (ref 1.6–2.3)
POTASSIUM SERPL-SCNC: 4.6 MMOL/L (ref 3.4–5.3)
SODIUM SERPL-SCNC: 133 MMOL/L (ref 133–144)

## 2021-04-21 PROCEDURE — 83735 ASSAY OF MAGNESIUM: CPT | Mod: ZL | Performed by: NURSE PRACTITIONER

## 2021-04-21 PROCEDURE — G0463 HOSPITAL OUTPT CLINIC VISIT: HCPCS

## 2021-04-21 PROCEDURE — 36415 COLL VENOUS BLD VENIPUNCTURE: CPT | Mod: ZL | Performed by: NURSE PRACTITIONER

## 2021-04-21 PROCEDURE — 99213 OFFICE O/P EST LOW 20 MIN: CPT | Performed by: NURSE PRACTITIONER

## 2021-04-21 PROCEDURE — 80048 BASIC METABOLIC PNL TOTAL CA: CPT | Mod: ZL | Performed by: NURSE PRACTITIONER

## 2021-04-21 RX ORDER — OMEPRAZOLE 40 MG/1
CAPSULE, DELAYED RELEASE ORAL
COMMUNITY
Start: 2021-04-13 | End: 2021-10-11

## 2021-04-21 RX ORDER — AMOXICILLIN 500 MG/1
CAPSULE ORAL
COMMUNITY
Start: 2021-04-13 | End: 2021-04-27

## 2021-04-21 RX ORDER — CLARITHROMYCIN 500 MG
TABLET ORAL
COMMUNITY
Start: 2021-04-19 | End: 2021-04-21

## 2021-04-21 RX ORDER — DIAZEPAM 5 MG
5 TABLET ORAL ONCE
Qty: 1 TABLET | Refills: 0 | Status: SHIPPED | OUTPATIENT
Start: 2021-04-21 | End: 2021-04-21

## 2021-04-21 RX ORDER — LEVOFLOXACIN 500 MG/1
500 TABLET, FILM COATED ORAL DAILY
COMMUNITY
Start: 2021-04-13 | End: 2021-04-21

## 2021-04-21 ASSESSMENT — PAIN SCALES - GENERAL: PAINLEVEL: NO PAIN (0)

## 2021-04-21 NOTE — NURSING NOTE
"Chief Complaint   Patient presents with     Results       Initial /72 (BP Location: Right arm, Patient Position: Sitting, Cuff Size: Adult Regular)   Pulse 78   Temp 98.2  F (36.8  C) (Tympanic)   Wt 79.8 kg (176 lb)   SpO2 98%   BMI 29.20 kg/m   Estimated body mass index is 29.2 kg/m  as calculated from the following:    Height as of 8/28/20: 1.654 m (5' 5.1\").    Weight as of this encounter: 79.8 kg (176 lb).  Medication Reconciliation: complete  Radha Espino  "

## 2021-04-21 NOTE — PATIENT INSTRUCTIONS
Patient Education     Understanding H. pylori and Ulcers  Ulcers are sores in the lining of your digestive tract. They were once thought to be caused by too much spicy food, stress, or an anxious personality. It's now known that most ulcers are likely due to infection with bacteria known as Helicobacter pylori (H. pylori). They could also be from using anti-inflammatory medicines such as aspirin and NSAIDs. These include ibuprofen and naproxen.      An ulcer can form in two areas of the digestive tract; the stomach and the duodenum (where the stomach meets the small intestine).   Common ulcer symptoms  Symptoms include:    Burning, cramping, or hunger-like pain in the stomach area, often 1 to 3 hours after a meal or in the middle of the night    Pain that gets better or worse with eating    Nausea or vomiting    Black, tarry, or bloody stools (which means the ulcer is bleeding)  Or you may have no symptoms.  Your evaluation  An evaluation by your healthcare provider can show if you have an ulcer and determine whether it was caused by H. pylori. Your healthcare provider may ask you questions, examine you, and possibly do some tests. These may include:     A special X-ray called an upper gastrointestinal series, to help locate an ulcer. Before the test, you drink a chalky liquid, called barium. This liquid helps the ulcer show up on the X-ray.    An endoscopic exam, done with a long tube with a camera on the end. The tube is passed through your mouth into your stomach, and allows the healthcare provider to get a closer look at your ulcer. You will be lightly sedated for this procedure. Your healthcare provider can also take a tissue sample to test for H. pylori.    Blood, stool, and breath tests are also available to show whether you have H. pylori in your digestive tract.  Your treatment  To kill H. pylori so your ulcer can heal, your healthcare provider will probably prescribe antibiotics. Other ulcer medicines that  help reduce stomach acid may also be prescribed as well. Testing after treatment may be recommended to be sure the H. pylori infection is gone. Usually, killing H. pylori helps keep the ulcer from returning. However, you can develop ulcers more than once in your lifetime.    [employer] last reviewed this educational content on 6/1/2019 2000-2021 [employer]. All rights reserved. This information is not intended as a substitute for professional medical care. Always follow your healthcare professional's instructions.    Patient Education     Discharge Instructions for Non-Hodgkin Lymphoma   You have been diagnosed with non-Hodgkin lymphoma (NHL). Lymphoma is a type of cancer that starts in your body s lymphatic (lymph) system. The lymph system is part of your immune system. It helps you fight disease and infection. This system goes to every part of your body. Because of this, non-Hodgkin lymphoma can spread to many places in your body. Treatment may include chemotherapy, radiation therapy, immunotherapy, or targeted therapy. In some cases, it may include a stem cell transplant. Here s what you need to know about caring for yourself during and after treatment.   General guidelines  Be sure to follow any instructions you get from your healthcare provider. Make sure you:     Take all medicines as instructed.    Understand what you can and can t do.    Balance rest with activity. Take naps during the day, if you are tired. But try to move around and walk as much as possible.    Keep your follow-up appointments.    Know how to reach your healthcare provider's office after office hours and on weekends and holidays.    Call your healthcare provider if you have any questions or are concerned about any symptoms.  Preventing and treating mouth sores   Many people get mouth sores during chemotherapy. Here s what you can do to help prevent them:     Brush your teeth with a soft-bristle toothbrush after every meal.  If your gums bleed while brushing, use other products to clean your teeth and gums. Try sponge brushes and liquid dental rinse or cleansers.    Don t use dental floss if you are at higher risk for bleeding. You have this risk if your healthcare provider or nurse tells you that you have a low platelet count.    Use any mouthwashes or rinses as instructed.    If you can t brush your teeth or use mouthwash, ask your provider about other ways to keep your mouth clean.    Check your mouth and tongue for white patches. This may be a sign of a yeast infection (thrush), This is a common side effect of chemotherapy. Be sure to tell your healthcare provider. He or she may prescribe medicine that can help.  Managing other side effects    Let your healthcare provider know if you have a sore throat. It may mean you have an infection. You may need medicine.    You may get sunburn-like skin changes from radiation treatment. Let your healthcare provider know. There are creams to help lessen mild pain, improve healing, and protect your skin.    Bathe or shower regularly to keep clean. During treatment, your body is not able to fight infections very well.    Use soap or shower gel with moisturizers. Apply lotion throughout the day. Treatment can make your skin dry.  You may have an upset stomach or vomiting during treatment. You may lose your appetite. Let your healthcare provider know. There are medicines that can help. Try to:     Eat small amounts of food many times during the day    Include some of your favorite foods in your diet    Make sure you drink lots of water and other healthy drinks    Eat soft, plain foods. For example, try pudding, gelatin, ice cream, sherbet, yogurt, or milkshakes.    Make sure you cook all food well and store all food safely. This helps to prevent illness from food.  The side effects you should watch for depend on the type of treatment you get. Ask your treatment team:     What you can expect    What  you should do about changes or problems    When you should call them  Follow-up care  Follow up as advised by your healthcare provider. Keep all follow-up appointments. You will need to have tests and watch for symptoms for the rest of your life.   Call 911  Call 911 right away if you have:     Heavy bleeding    Trouble breathing    Cough    Chest pain  When to call your healthcare provider  Call your healthcare provider right away if you have any of these:     Fever of 100.4 F (38 C) or higher, or as directed by your healthcare provider    Chills    Signs of an infection, such as an area with redness, pain, swelling, warmth, or fluid leaking    A cough, or coughing up yellow or green mucus    Wheezing or shortness of breath    Bleeding    Headache, confusion, trouble focusing, or memory loss    Feeling dizzy or lightheaded    Fast or irregular heartbeat    Rash or itchy, raised, red areas on your skin, called hives    Yellowish skin or whites of the eyes, called jaundice    New lumps under your arms, on or near your neck, or on or near your groin    Pain that doesn't get better or keeps getting worse    New swelling, warmth, or redness in 1 arm or leg  [employer] last reviewed this educational content on 12/1/2019 2000-2021 [employer]. All rights reserved. This information is not intended as a substitute for professional medical care. Always follow your healthcare professional's instructions.

## 2021-04-21 NOTE — TELEPHONE ENCOUNTER
Calling asking for a mediation for something to relax her for her pet scan tomorrow due to being claustrophobic . Please send something to pharmacy for her.    ROXI Garcia

## 2021-04-26 ENCOUNTER — TRANSFERRED RECORDS (OUTPATIENT)
Dept: HEALTH INFORMATION MANAGEMENT | Facility: CLINIC | Age: 71
End: 2021-04-26

## 2021-04-27 ENCOUNTER — OFFICE VISIT (OUTPATIENT)
Dept: FAMILY MEDICINE | Facility: OTHER | Age: 71
End: 2021-04-27
Attending: NURSE PRACTITIONER
Payer: MEDICARE

## 2021-04-27 VITALS
SYSTOLIC BLOOD PRESSURE: 152 MMHG | WEIGHT: 178 LBS | TEMPERATURE: 98.4 F | DIASTOLIC BLOOD PRESSURE: 98 MMHG | HEART RATE: 77 BPM | OXYGEN SATURATION: 98 % | BODY MASS INDEX: 29.53 KG/M2

## 2021-04-27 DIAGNOSIS — A04.8 H. PYLORI INFECTION: Primary | ICD-10-CM

## 2021-04-27 DIAGNOSIS — C88.40 MALT LYMPHOMA: ICD-10-CM

## 2021-04-27 PROCEDURE — 99213 OFFICE O/P EST LOW 20 MIN: CPT | Performed by: NURSE PRACTITIONER

## 2021-04-27 PROCEDURE — G0463 HOSPITAL OUTPT CLINIC VISIT: HCPCS

## 2021-04-27 RX ORDER — BISMUTH SUBSALICYLATE 262 MG/1
1 TABLET, CHEWABLE ORAL
Qty: 56 TABLET | Refills: 0 | Status: SHIPPED | OUTPATIENT
Start: 2021-04-27 | End: 2021-05-11

## 2021-04-27 RX ORDER — TETRACYCLINE HYDROCHLORIDE 500 MG/1
500 CAPSULE ORAL 4 TIMES DAILY
Qty: 56 CAPSULE | Refills: 0 | Status: SHIPPED | OUTPATIENT
Start: 2021-04-27 | End: 2021-05-11

## 2021-04-27 RX ORDER — METRONIDAZOLE 250 MG/1
250 TABLET ORAL 4 TIMES DAILY
Qty: 56 TABLET | Refills: 0 | Status: SHIPPED | OUTPATIENT
Start: 2021-04-27 | End: 2021-05-11

## 2021-04-27 ASSESSMENT — PAIN SCALES - GENERAL: PAINLEVEL: NO PAIN (0)

## 2021-04-27 NOTE — NURSING NOTE
"Chief Complaint   Patient presents with     Patient Request       Initial BP (!) 152/98 (BP Location: Right arm, Patient Position: Sitting, Cuff Size: Adult Regular)   Pulse 77   Temp 98.4  F (36.9  C) (Tympanic)   Wt 80.7 kg (178 lb)   SpO2 98%   BMI 29.53 kg/m   Estimated body mass index is 29.53 kg/m  as calculated from the following:    Height as of 8/28/20: 1.654 m (5' 5.1\").    Weight as of this encounter: 80.7 kg (178 lb).  Medication Reconciliation: complete  Radha Espino  "

## 2021-04-27 NOTE — PATIENT INSTRUCTIONS
Patient Education     Understanding H. pylori and Ulcers  Ulcers are sores in the lining of your digestive tract. They were once thought to be caused by too much spicy food, stress, or an anxious personality. It's now known that most ulcers are likely due to infection with bacteria known as Helicobacter pylori (H. pylori). They could also be from using anti-inflammatory medicines such as aspirin and NSAIDs. These include ibuprofen and naproxen.      An ulcer can form in two areas of the digestive tract; the stomach and the duodenum (where the stomach meets the small intestine).   Common ulcer symptoms  Symptoms include:    Burning, cramping, or hunger-like pain in the stomach area, often 1 to 3 hours after a meal or in the middle of the night    Pain that gets better or worse with eating    Nausea or vomiting    Black, tarry, or bloody stools (which means the ulcer is bleeding)  Or you may have no symptoms.  Your evaluation  An evaluation by your healthcare provider can show if you have an ulcer and determine whether it was caused by H. pylori. Your healthcare provider may ask you questions, examine you, and possibly do some tests. These may include:     A special X-ray called an upper gastrointestinal series, to help locate an ulcer. Before the test, you drink a chalky liquid, called barium. This liquid helps the ulcer show up on the X-ray.    An endoscopic exam, done with a long tube with a camera on the end. The tube is passed through your mouth into your stomach, and allows the healthcare provider to get a closer look at your ulcer. You will be lightly sedated for this procedure. Your healthcare provider can also take a tissue sample to test for H. pylori.    Blood, stool, and breath tests are also available to show whether you have H. pylori in your digestive tract.  Your treatment  To kill H. pylori so your ulcer can heal, your healthcare provider will probably prescribe antibiotics. Other ulcer medicines that  help reduce stomach acid may also be prescribed as well. Testing after treatment may be recommended to be sure the H. pylori infection is gone. Usually, killing H. pylori helps keep the ulcer from returning. However, you can develop ulcers more than once in your lifetime.    [employer] last reviewed this educational content on 6/1/2019 2000-2021 [employer]. All rights reserved. This information is not intended as a substitute for professional medical care. Always follow your healthcare professional's instructions.    Take Pepto Bismol 4 times a day.   Take Tetracycline 500 mg 4 times a day.   Take Flagyl 250 mg 4 times a day.   Take antibiotics with food.   Add yogurt into diet daily.   Keep hydrated.   Follow up if not improving.

## 2021-04-27 NOTE — PROGRESS NOTES
Assessment & Plan     With EGD and BX, H. Pylori was identified. She was treated with Levaquin and developed hives. She was then treated with Clarithromycin and developed hives. She was waiting for [facility] to prescribe a different ABX. No ABX sent into pharmacy.     I discussed case with Kaitlynn, Pharm-D at this pharmacy and reviewed UTD. Other treatment option is Pepto bismol, flagyl, and tetracycline. This is the treatment that I prescribed.     She saw oncology at [facility] yesterday and was unhappy with the visit. The oncologist asked her why she was there. She was set up to see oncology through GI after her BX. She reports the oncologist told her she can take ABX to clear cancer. I feel she miss understood oncology and was probably referring to H. Pylori infection.     When I saw Ivonne on 4-21-21, I did discuss BX result and referral to oncology. She deferred me to place oncology referral as she was set up with [facility]. She would like to see Dr. Brambila and tells me that [facility] sent a referral to [facility] for Dr. Brambila. Ivonne will contact me in a week if she hasn't heard from his office.       (A04.8) H. pylori infection  (primary encounter diagnosis)  Comment: treat with ABX and Pepto Bismol  Plan: metroNIDAZOLE (FLAGYL) 250 MG tablet, bismuth         subsalicylate (PEPTO BISMOL) 262 MG chewable         tablet, tetracycline (ACHROMYCIN/SUMYCIN) 500         MG capsule            (C88.4) MALT lymphoma (H)  Comment: needs treatment plan for lymphoma  Plan: Consult with Dr. Brambila         See Patient Instructions    Return if symptoms worsen or fail to improve.    Bela Sandhu, NP  [facility] - NICOLEABDULKADIR Coronado is a 71 year old who presents for the following health issues     HPI .    H-Pylori infection that has not been taken care of . Due to patient break out in hives from medication.    [facility] oncology  Asked her why she was at her apt. Yesterday.    Had pet scan last week. Results are not known  Dr. Gong office still hasn't reached out.       Review of Systems   Constitutional, HEENT, cardiovascular, pulmonary, gi and gu systems are negative, except as otherwise noted.      Objective    BP (!) 152/98 (BP Location: Right arm, Patient Position: Sitting, Cuff Size: Adult Regular)   Pulse 77   Temp 98.4  F (36.9  C) (Tympanic)   Wt 80.7 kg (178 lb)   SpO2 98%   BMI 29.53 kg/m    Body mass index is 29.53 kg/m .  Physical Exam   GENERAL: healthy, alert and no distress  RESP: lungs clear to auscultation - no rales, rhonchi or wheezes  CV: regular rate and rhythm, normal S1 S2, no S3 or S4, no murmur, click or rub, no peripheral edema and peripheral pulses strong  SKIN: no suspicious lesions or rashes  PSYCH: mentation appears normal, affect normal/bright

## 2021-04-30 ENCOUNTER — TELEPHONE (OUTPATIENT)
Dept: FAMILY MEDICINE | Facility: OTHER | Age: 71
End: 2021-04-30

## 2021-04-30 NOTE — TELEPHONE ENCOUNTER
Pt reports taking metronidazole and pepto and now experiencing black stool that started this morning.     Stool is formed, last 24 hours 1 BM, no diarrhea, no vomiting, no abd pain.    Pt reports mild lower back ache, but does report hx of this pain occasionally.     Reviewed expected side effects with pt from pepto and metronidazole pt has no other concerns and/or sx to report. Pt just wanted to confirm black stools are a normal side effect from prescribed medication. Pt advised to call back with any other questions and/or changes or if she experiences watery stools. Also encouraged fluids.     Routing to PCP.

## 2021-05-03 ENCOUNTER — TELEPHONE (OUTPATIENT)
Dept: FAMILY MEDICINE | Facility: OTHER | Age: 71
End: 2021-05-03

## 2021-05-03 NOTE — TELEPHONE ENCOUNTER
9:57 AM    Reason for Call: Phone Call    Description: FYI: Patient hasn't heard from Dr Ian Brambila office for the oncology referral.    Was an appointment offered for this call? No  If yes : Appointment type              Date    Preferred method for responding to this message: Telephone Call  What is your phone number ? [phone]    If we cannot reach you directly, may we leave a detailed response at the number you provided? Yes    Can this message wait until your PCP/provider returns, if available today? YES, No    Tiesha Hassan

## 2021-05-04 NOTE — TELEPHONE ENCOUNTER
Called and spoke with patient. She did get a call yesterday and is scheduled to see dr cox Monday.    ROXI Garcia

## 2021-05-06 ENCOUNTER — NURSE TRIAGE (OUTPATIENT)
Dept: FAMILY MEDICINE | Facility: OTHER | Age: 71
End: 2021-05-06

## 2021-05-06 NOTE — TELEPHONE ENCOUNTER
She can continue Pepto if she is tolerating. OK to hold on ABX. She had 8 days of ABX and ABX prior. I feel she has been on long enough for H. Pylori infection. I feel we should let her stomach settle down from ABX. Please advise.

## 2021-05-06 NOTE — TELEPHONE ENCOUNTER
Call returned to patient, provided update per Bela Sandhu's orders below.     Patient verbalized understanding.

## 2021-05-06 NOTE — TELEPHONE ENCOUNTER
8:16 AM    Reason for Call: Medication Side Effects     Description:     Dx with H Pylori taking metronidazole and tetracycline.     Experiencing nausea and abdominal cramping in lower abdomen on 5/5/21. Reports cramping has subsided, nausea continues. Patient has been taking pepto bismol, does not seem to be helping with the nausea.     Patient states she has stopped taking metronidazole and tetracycline due to side effects from the medications.     Please advise.         Was an appointment offered for this call? No  If yes : Appointment type              Date    Preferred method for responding to this message: Telephone Call  What is your phone number - Patient can be reached at [phone]    If we cannot reach you directly, may we leave a detailed response at the number you provided? Yes    Can this message wait until your PCP/provider returns, if available today?   Not applicable        Abbie Mena RN

## 2021-05-20 NOTE — PROGRESS NOTES
Assessment & Plan     (I10) Essential hypertension  (primary encounter diagnosis)  Comment: BP good today. Check BMP today.   Plan: Basic metabolic panel        Continue to stay active and eat healthy.       See Patient Instructions    Return in about 6 months (around 11/26/2021) for BP check.    Bela Sandhu NP  [facility]    Jennifer Coronado is a 71 year old who presents for the following health issues     HPI     Hypertension Follow-up      Do you check your blood pressure regularly outside of the clinic? No     Are you following a low salt diet? Yes    Are your blood pressures ever more than 140 on the top number (systolic) OR more   than 90 on the bottom number (diastolic), for example 140/90? No        Review of Systems   Constitutional, HEENT, cardiovascular, pulmonary, gi and gu systems are negative, except as otherwise noted.      Objective    /72 (BP Location: Right arm, Patient Position: Sitting, Cuff Size: Adult Regular)   Pulse 75   Temp 97.1  F (36.2  C) (Tympanic)   Wt 78.9 kg (174 lb)   SpO2 98%   BMI 28.87 kg/m    Body mass index is 28.87 kg/m .  Physical Exam   GENERAL: healthy, alert and no distress  RESP: lungs clear to auscultation - no rales, rhonchi or wheezes  CV: regular rate and rhythm, normal S1 S2, no S3 or S4, no murmur, click or rub, no peripheral edema and peripheral pulses strong  ABDOMEN: soft, nontender, no hepatosplenomegaly, no masses and bowel sounds normal  MS: no gross musculoskeletal defects noted, no edema  SKIN: no suspicious lesions or rashes  NEURO: Normal strength and tone, mentation intact and speech normal  PSYCH: mentation appears normal, affect normal/bright    Results for orders placed or performed in visit on 05/26/21   Basic metabolic panel     Status: Abnormal   Result Value Ref Range    Sodium 137 133 - 144 mmol/L    Potassium 4.3 3.4 - 5.3 mmol/L    Chloride 105 94 - 109 mmol/L    Carbon Dioxide 27 20 - 32 mmol/L     Anion Gap 5 3 - 14 mmol/L    Glucose 101 (H) 70 - 99 mg/dL    Urea Nitrogen 22 7 - 30 mg/dL    Creatinine 1.01 0.52 - 1.04 mg/dL    GFR Estimate 56 (L) >60 mL/min/[1.73_m2]    GFR Estimate If Black 64 >60 mL/min/[1.73_m2]    Calcium 8.8 8.5 - 10.1 mg/dL

## 2021-05-26 ENCOUNTER — OFFICE VISIT (OUTPATIENT)
Dept: FAMILY MEDICINE | Facility: OTHER | Age: 71
End: 2021-05-26
Attending: NURSE PRACTITIONER
Payer: MEDICARE

## 2021-05-26 VITALS
HEART RATE: 75 BPM | WEIGHT: 174 LBS | DIASTOLIC BLOOD PRESSURE: 72 MMHG | SYSTOLIC BLOOD PRESSURE: 136 MMHG | BODY MASS INDEX: 28.87 KG/M2 | OXYGEN SATURATION: 98 % | TEMPERATURE: 97.1 F

## 2021-05-26 DIAGNOSIS — I10 ESSENTIAL HYPERTENSION: Primary | ICD-10-CM

## 2021-05-26 LAB
ANION GAP SERPL CALCULATED.3IONS-SCNC: 5 MMOL/L (ref 3–14)
BUN SERPL-MCNC: 22 MG/DL (ref 7–30)
CALCIUM SERPL-MCNC: 8.8 MG/DL (ref 8.5–10.1)
CHLORIDE SERPL-SCNC: 105 MMOL/L (ref 94–109)
CO2 SERPL-SCNC: 27 MMOL/L (ref 20–32)
CREAT SERPL-MCNC: 1.01 MG/DL (ref 0.52–1.04)
GFR SERPL CREATININE-BSD FRML MDRD: 56 ML/MIN/{1.73_M2}
GLUCOSE SERPL-MCNC: 101 MG/DL (ref 70–99)
POTASSIUM SERPL-SCNC: 4.3 MMOL/L (ref 3.4–5.3)
SODIUM SERPL-SCNC: 137 MMOL/L (ref 133–144)

## 2021-05-26 PROCEDURE — G0463 HOSPITAL OUTPT CLINIC VISIT: HCPCS

## 2021-05-26 PROCEDURE — 99213 OFFICE O/P EST LOW 20 MIN: CPT | Performed by: NURSE PRACTITIONER

## 2021-05-26 PROCEDURE — 36415 COLL VENOUS BLD VENIPUNCTURE: CPT | Mod: ZL | Performed by: NURSE PRACTITIONER

## 2021-05-26 PROCEDURE — 80048 BASIC METABOLIC PNL TOTAL CA: CPT | Mod: ZL | Performed by: NURSE PRACTITIONER

## 2021-05-26 ASSESSMENT — PAIN SCALES - GENERAL: PAINLEVEL: NO PAIN (0)

## 2021-05-26 NOTE — PATIENT INSTRUCTIONS
Patient Education     Controlling High Blood Pressure   High blood pressure (hypertension) is often called the silent killer. This is because many people who have it, don t know it. It can be very dangerous. High blood pressure can raise your risk of heart attack, stroke, heart disease, and heart failure. Controlling your blood pressure can decrease your risk of these problems. It's important to know the appropriate blood pressure range and remember to check your blood pressure regularly. Doing so can save your life.   Blood pressure measurements are given as 2 numbers. Systolic blood pressure is the upper number. This is the pressure when the heart contracts. Diastolic blood pressure is the lower number. This is the pressure when the heart relaxes between beats.   Blood pressure is categorized as normal, elevated, or stage 1 or stage 2 high blood pressure:     Normal blood pressure is systolic of less than 120 and diastolic of less than 80 (120/80)    Elevated blood pressure is systolic of 120 to 129 and diastolic less than 80    Stage 1 high blood pressure is systolic of 130 to 139 or diastolic between 80 to 89    Stage 2 high blood pressure is when systolic is 140 or higher or the diastolic is 90 or higher  A heart-healthy lifestyle can help you control your blood pressure without medicines. Here are some things you can do to pursue a heart-healthy lifestyle:     Choose heart-healthy foods     Select low-salt, low-fat foods. Limit sodium intake to 2,400 mg per day or the amount suggested by your healthcare provider.    Limit canned, dried, cured, packaged, and fast foods. These can contain a lot of salt.    Eat 8 to 10 servings of fruits and vegetables every day.    Choose lean meats, fish, or chicken.    Eat whole-grain pasta, brown rice, and beans.    Eat 2 to 3 servings of low-fat or fat-free dairy products.    Ask your doctor about the DASH eating plan. This plan helps reduce blood pressure.    When you go to  a restaurant, ask that your meal be prepared with no added salt.    Stay at a healthy weight     Ask your healthcare provider how many calories to eat a day. Then stick to that number.    Ask your healthcare provider what weight range is healthiest for you. If you are overweight, a weight loss of only 3% to 5% of your body weight can help lower blood pressure. Generally, a good weight loss goal is to lose 10% of your body weight in a year.    Limit snacks and sweets.    Get regular exercise.    Get up and get active     Find activities you enjoy that can be done alone or with friends or family. Such activities might include bicycling, dancing, walking, or jogging.    Park farther away from building entrances to walk more.    Use stairs instead of the elevator.    When you can, walk or bike instead of driving.    [place] leaves, garden, or do household repairs.    Be active at a moderate to vigorous level of physical activity for at least 40 minutes for a minimum of 3 to 4 days a week.     Manage stress     Make time to relax and enjoy life. Find time to laugh.    Communicate your concerns with your loved ones and your healthcare provider.    Visit with family and friends, and keep up with hobbies.    Limit alcohol and quit smoking     Men should have no more than 2 drinks per day.    Women should have no more than 1 drink per day.    Talk with your healthcare provider about quitting smoking. Smoking significantly increases your risk for heart disease and stroke. Ask your healthcare provider about community smoking cessation programs and other options.    Medicines  If lifestyle changes aren t enough, your healthcare provider may prescribe high blood pressure medicine. Take all medicines as prescribed. If you have any questions about your medicines, ask your healthcare provider before stopping or changing them.   [employer] last reviewed this educational content on 6/1/2019 2000-2021 [employer]. All  rights reserved. This information is not intended as a substitute for professional medical care. Always follow your healthcare professional's instructions.

## 2021-05-26 NOTE — NURSING NOTE
"Chief Complaint   Patient presents with     Hypertension       Initial /72 (BP Location: Right arm, Patient Position: Sitting, Cuff Size: Adult Regular)   Pulse 75   Temp 97.1  F (36.2  C) (Tympanic)   Wt 78.9 kg (174 lb)   SpO2 98%   BMI 28.87 kg/m   Estimated body mass index is 28.87 kg/m  as calculated from the following:    Height as of 8/28/20: 1.654 m (5' 5.1\").    Weight as of this encounter: 78.9 kg (174 lb).  Medication Reconciliation: complete  Radha Espino  "

## 2021-06-14 ENCOUNTER — NURSE TRIAGE (OUTPATIENT)
Dept: NURSING | Facility: CLINIC | Age: 71
End: 2021-06-14

## 2021-06-14 NOTE — TELEPHONE ENCOUNTER
Data:  Pt calling to report this morning they went to urinate and didn't feel like they emptied their bladder all the way and also noticed a burning sensation when urinating. Pt is concerned about a bladder infection. Since then pt has been able to urinate more but still feels as though they are not emptying their bladder all the way. Pt is urinating more than a few drops. Denies fever, hematuria, incontinence, frequency or foul smelling urine. Pt would like to be seen for this and has already contacted scheduling and the soonest appt is for Wednesday.        Action:   Per protocol pt to be seen in the office today. Advised pt go to [facility] since unable to get an appt. Care advise given.      Response:   Pt verbalizes understanding and agrees with plan of care. Pt will go to [facility].     Fabio Dhaliwal RN 6/14/2021 1:29 PM  [facility] Nurse Advisor       Reason for Disposition    Discomfort (pain, burning or stinging) when passing urine and female    Age > 50 years    Additional Information    Negative: Shock suspected (e.g., cold/pale/clammy skin, too weak to stand, low BP, rapid pulse)    Negative: Sounds like a life-threatening emergency to the triager    Negative: Unable to urinate (or only a few drops) and bladder feels very full    Negative: Vomiting    Negative: Patient sounds very sick or weak to the triager    Negative: SEVERE pain with urination    Negative: Fever > 100.4 F (38.0 C)    Negative: Side (flank) or lower back pain present    Negative: Taking antibiotic > 24 hours for UTI and fever persists    Negative: Taking antibiotic > 3 days for UTI and painful urination not improved    Negative: Unusual vaginal discharge    Negative: > 2 UTIs in last year    Negative: Patient is worried about sexually transmitted disease (STD)    Protocols used: URINARY SYMPTOMS-A-[place], URINATION PAIN - FEMALE-A-[place]    COVID 19 Nurse Triage Plan/Patient Instructions    Please be aware that novel coronavirus (COVID-19) may be  circulating in the community. If you develop symptoms such as fever, cough, or SOB or if you have concerns about the presence of another infection including coronavirus (COVID-19), please contact your health care provider or visit [email or web address].     Disposition/Instructions    In-Person Visit with provider recommended. Reference Visit Selection Guide.    Thank you for taking steps to prevent the spread of this virus.  o Limit your contact with others.  o Wear a simple mask to cover your cough.  o Wash your hands well and often.    Resources    [facility]: About COVID-19: [email or web address]    CDC: What to Do If You're Sick: www.cdc.gov/coronavirus/2019-ncov/about/steps-when-sick.html    CDC: Ending Home Isolation: www.cdc.gov/coronavirus/2019-ncov/hcp/disposition-in-home-patients.html     CDC: Caring for Someone: www.cdc.gov/coronavirus/2019-ncov/if-you-are-sick/care-for-someone.html     [facility]: Interim Guidance for Hospital Discharge to Home: www.[email or web address].[email or web address]/diseases/coronavirus/hcp/hospdischarge.pdf    [facility] clinical trials (COVID-19 research studies): [employer]affairs.[facility]-clinical-trials     Below are the COVID-19 hotlines at the [place] [facility] ([facility]). Interpreters are available.   o For health questions: Call 573-390-6014 or 1-692.632.7186 (7 a.m. to 7 p.m.)  o For questions about schools and childcare: Call 929-778-1889 or 1-864.321.4020 (7 a.m. to 7 p.m.)

## 2021-07-14 DIAGNOSIS — I10 ESSENTIAL HYPERTENSION: ICD-10-CM

## 2021-07-14 DIAGNOSIS — J44.9 CHRONIC OBSTRUCTIVE PULMONARY DISEASE, UNSPECIFIED COPD TYPE (H): Primary | ICD-10-CM

## 2021-07-14 NOTE — TELEPHONE ENCOUNTER
Advair disk      Last Written Prescription Date:  8/28/20  Last Fill Quantity: 0,   # refills: 0  Last Office Visit: 5/26/21  Future Office visit:       Routing refill request to provider for review/approval because:  Medication is reported/historical

## 2021-07-15 RX ORDER — LISINOPRIL 10 MG/1
TABLET ORAL
Qty: 90 TABLET | Refills: 2 | Status: SHIPPED | OUTPATIENT
Start: 2021-07-15

## 2021-08-31 DIAGNOSIS — J44.9 CHRONIC OBSTRUCTIVE PULMONARY DISEASE, UNSPECIFIED COPD TYPE (H): ICD-10-CM

## 2021-08-31 NOTE — TELEPHONE ENCOUNTER
ADVAIR DISKUS 250-50 MCG/DOSE inhaler        Last Written Prescription Date:  7/14/21  Last Fill Quantity: 60,   # refills: 1  Last Office Visit: 5/26/21  Future Office visit:       Routing refill request to provider for review/approval because:    Long-Acting Beta Agonist Inhalers Protocol Failed    Order for Serevent, Striverdi, or Foradil and pt has steroid inhaler

## 2021-09-16 ENCOUNTER — TRANSFERRED RECORDS (OUTPATIENT)
Dept: HEALTH INFORMATION MANAGEMENT | Facility: CLINIC | Age: 71
End: 2021-09-16

## 2021-10-11 ENCOUNTER — PATIENT OUTREACH (OUTPATIENT)
Dept: RADIATION ONCOLOGY | Facility: HOSPITAL | Age: 71
End: 2021-10-11

## 2021-10-11 DIAGNOSIS — C88.40 EXTRANODAL MARGINAL ZONE LYMPHOMA OF MUCOSA-ASSOCIATED LYMPHOID TISSUE (MALT) OF STOMACH: ICD-10-CM

## 2021-10-11 NOTE — PROGRESS NOTES
PRE VISIT MEDICAL ONCOLOGY     REASON FOR APPOINTMENT    Type of Cancer -   Gastric MALT lymphoma      SYMPTOMS   Symptom onset - no symptoms, found on screening    Medical Provider Seen Initially -     PROVIDERS  Referring MD - Dr. Brambila  PCP - Bela Sandhu  Surgeon - NA  Other provider(s) seen for consultation or treatment -      TREATMENT TO-DATE FOR THIS CANCER  Biopsy - 9-17-21 gastric biopsy  Extranodal marginal zone B-cell lymphoma    Surgery -NA       Chemotherapy NA      Oncologist - Dr. Brambila  Hormonal therapy used - NA  Other treatments for this Cancer (including over-the-counter) -     PRIOR HISTORY OF CANCER  No prior history  RECENT IMAGING STUDIES    (list setting/date)    PET - 4-22-21          CENTRAL LINE/PORT   None                  FAMILY CANCER HISTORY (list relative/type of cancer)        Father lung  Mother bone/pancreatic          TOBACCO USE HISTORY     Former smoker     OTHER PERTINENT CANCER INFORMATION NOT IDENTIFIED ELSEWHERE      Bruising easily  Right handed  Book keeping  Denies wt loss  Denies nausea or vomiting  Dr. Brambila   Gastroenterologist  Dr. Owen.  Denies pain.

## 2021-10-15 PROBLEM — K21.9 GASTROESOPHAGEAL REFLUX DISEASE WITHOUT ESOPHAGITIS: Status: ACTIVE | Noted: 2021-10-15

## 2021-10-22 ENCOUNTER — ONCOLOGY VISIT (OUTPATIENT)
Dept: RADIATION ONCOLOGY | Facility: HOSPITAL | Age: 71
End: 2021-10-22
Attending: RADIOLOGY
Payer: MEDICARE

## 2021-10-22 VITALS — WEIGHT: 174.8 LBS | HEIGHT: 66 IN | BODY MASS INDEX: 28.09 KG/M2

## 2021-10-22 DIAGNOSIS — C88.40 EXTRANODAL MARGINAL ZONE LYMPHOMA OF MUCOSA-ASSOCIATED LYMPHOID TISSUE (MALT) OF STOMACH: Primary | ICD-10-CM

## 2021-10-22 PROCEDURE — G0463 HOSPITAL OUTPT CLINIC VISIT: HCPCS | Performed by: RADIOLOGY

## 2021-10-22 PROCEDURE — 99205 OFFICE O/P NEW HI 60 MIN: CPT | Performed by: RADIOLOGY

## 2021-10-22 PROCEDURE — 99417 PROLNG OP E/M EACH 15 MIN: CPT | Mod: GZ | Performed by: RADIOLOGY

## 2021-10-22 RX ORDER — DIAZEPAM 5 MG
5 TABLET ORAL DAILY PRN
Qty: 2 TABLET | Refills: 0 | Status: SHIPPED | OUTPATIENT
Start: 2021-10-22

## 2021-10-22 RX ORDER — ONDANSETRON 8 MG/1
8 TABLET, FILM COATED ORAL EVERY 8 HOURS PRN
Qty: 90 TABLET | Refills: 0 | Status: SHIPPED | OUTPATIENT
Start: 2021-10-22

## 2021-10-22 ASSESSMENT — ENCOUNTER SYMPTOMS
ADENOPATHY: 0
FREQUENCY: 1
APPETITE CHANGE: 0
NECK PAIN: 0
TREMORS: 0
VOMITING: 0
CHEST TIGHTNESS: 0
RECTAL PAIN: 0
ABDOMINAL DISTENTION: 0
COLOR CHANGE: 0
FATIGUE: 1
SPEECH DIFFICULTY: 0
BRUISES/BLEEDS EASILY: 1
NAUSEA: 0
UNEXPECTED WEIGHT CHANGE: 0
COUGH: 0
CONFUSION: 0
BACK PAIN: 1
DIARRHEA: 0
SHORTNESS OF BREATH: 0
SORE THROAT: 0
NECK STIFFNESS: 0
CONSTIPATION: 0
DIAPHORESIS: 0
WEAKNESS: 0
AGITATION: 0
FEVER: 0
SLEEP DISTURBANCE: 0
DIFFICULTY URINATING: 0
SEIZURES: 0
NERVOUS/ANXIOUS: 0
HEADACHES: 0
VOICE CHANGE: 0
HEMATURIA: 0

## 2021-10-22 ASSESSMENT — PAIN SCALES - GENERAL: PAINLEVEL: NO PAIN (0)

## 2021-10-22 ASSESSMENT — MIFFLIN-ST. JEOR: SCORE: 1319.64

## 2021-10-22 NOTE — PROGRESS NOTES
[facility]  DEPARTMENT OF RADIATION ONCOLOGY  CONSULTATION NOTE    Name: Ivonne Kirkland MRN: [id number]   : 1950 (71 year old)  Date of Service: Oct 22, 2021 Referring: Dr. 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 In the setting of progressing H. pylori-negative disease, medical oncology referred the patient for consultation about the role of involved-site radiation therapy with curative intent (Stage: Per convention, staged as Celeste/Lugano T3N1M0 (II-1) by EUS. Primary: Gastric antral MALT lymphoma (initially H. pylori positive, subsequently H. pylori negative). Nodes: N1 per EUS (not appropriate for biopsy due to intervening involved tissue). Metastasis: N0 per PET-CT. Markers: Negative for MALT1 gene rearrangement (AP12 not assessed). Among the additional co-morbidities and social determinants affecting toxicity risk are poor tolerance of multiple medications occluding including cramping from omeprazole, thrombocytopenia, atraumatic ecchymoses with platelet count greater than 90 (referred to primary care service for evaluation), lifelong anxiety (avoid elevator; requires Valium for PET-CT), and multiple visits to the emergency room (hypertension, drug reaction, gangrene fear, numbness, palpitations, etc.). Upon completion of treatment, the patient and her  plan to move to [place] to be by their son and his family.  2021 PET-CT      History of  Present Illness (CE)   The patient's oncologic history across multiple institutions is abstracted as follows:    Diagnosis:    11/6/2020 Abdominal ultrasound: Study obtained for screening for an abdominal aortic aneurysm. Incidental finding of an asymptomatic 7.3-cm right upper quadrant mass.    4/5/2021 CT abdomen/pelvis with contrast: Gastric antral mass measuring 6.5-cm.    4/12/2021 Upper endoscopy: Gastric biopsy yielded MALT lymphoma with occasional H. pylori markers as above). Appeared to originate in the mucosa layer and not penetrate into the serosa. 2 reported nodes measuring up to 1.4-cm (not biopsied due to overlying disease). Staged as T3N1 by endoscopic ultrasound.    Staging/Treatment:    4/22/2021 PET-CT: Confirmatory hypermetabolic activity in the distal gastric region. Cervical adenopathy of uncertain significance. Through gastroenterology and primary care provider,. first-line therapy with antibiotics (levofloxacin, amoxicillin, and omeprazole). Due to bleeding hands, change to alternative antibiotic therapy (clarithromycin, amoxicillin, and omeprazole). Due to hives, change to another alternative antibiotic therapy (metronidazole, tetracycline, and omeprazole). Due to gastric cramping, antibiotic therapy discontinued after 7 days (omeprazole also discontinued per patient). Total duration of antibiotic therapy appears to have been approximately 11 days.    5/10/2021 Medical oncology consultation: Clinically uncertain if T3N0 vs. T3N1 due to sonographically borderline node that was not biopsied. Recommended observation. Recommended reserving radiation therapy for salvage therapy with curative intent versus systemic therapy with Rituxan with palliative intent    6/2/2021 Repeat upper endoscopy with endoscopic ultrasound: Re-demonstration of a large infiltrated, nonbleeding mass at the gastric antrum. Well-defined borders, depth 3.0-cm with evidence of infiltration into the muscularis propria (later  4). Gastric biopsy yielded MALT lymphoma that was negative for H. pylori. Single suspicious perigastric node located deep to the gastric disease (0.8-cm maximal diameter; well-circumscribed; no biopsy since deep to the gastric disease). Designated as T3N1 by EUS.    2021 Surveillance upper endoscopy with endoscopic ultrasound: Progression of well-circumscribed disease involving the gastric antrum disease (depth 3.4-cm) and a single perigastric node disease (1.1-cm). Gastric biopsy yielded MALT lymphoma that was negative for H. pylori. Again designated as T3N1 by EUS (node not biopsied sensitive to the gastric disease).    10/4/2021 Medical oncology follow-up: Recommended radiation therapy as an alternative to observation with possible spontaneous regression.    Pendin/3/2021 PET-CT: Tentatively scheduled at [facility].    We reviewed other conditions that can influence the choice of therapy and its morbidity. The patient has no direct knowledge of radiation therapy. She feels that she has no complaints related to her gastric MALT lymphoma. She denies lower chest or abdominal pain, early satiety, hemoptysis, hematemesis, melena, dark stools, nausea, vomiting, cramping, or recurrent reflux symptoms. She feels in fair general health. Today's examination noted atraumatic ecchymoses of the dorsum of both hands. The patient denied epistaxis, dental bleeding, hematuria, or blood per rectum. Laboratory blood work demonstrated no thrombocytopenia (she agreed to see her primary care service).    The patient's principal complaint has lifelong anxieties which are barriers to routine daily activities such as walking onto an elevator (existed prior to an episode of being trapped with other people on an elevator that had stopped). She feels chronically fatigued but does not require a nap. She acknowledges a relatively sedentary lifestyle (chores, TV; no formal exercise). She denies a history of erosive or ulcerative  diseases of the stomach and small bowel. She denies a history of Crohn's disease, ulcerative colitis, and other chronic gastrointestinal complaints. Regarding risk factors for gastric MALT lymphoma, she has no known history of H. pylori infection (prior), thyroiditis, Sjogren's syndrome, celiac disease, or immunosuppression. We counseled the patient about COVID-19 risks, precautions, and potential impact on her radiation therapy. She denied recent known exposure to COVID-19, risky behaviors, or related symptoms including febrile, chest, gustatory, gastrointestinal, and systemic complaints.    Chemotherapy History: None.  Radiation History: Pending.  Implanted Cardiac Devices: None.  Implanted Chest Wall Infusion Port: No port.    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     ORTHOPEDIC SURGERY      right wrist fracture     ORTHOPEDIC SURGERY      left elbow cyst     SOFT TISSUE SURGERY      I and D cysts on back and chest     UPPER GASTROINTESTINAL ENDOSCOPY       Outpatient Medications Prior to Visit   Medication Sig Dispense Refill     ADVAIR DISKUS 250-50 MCG/DOSE inhaler Inhale 1 puff into the lungs 2 times daily 60 each 2     albuterol (PROAIR HFA/PROVENTIL HFA/VENTOLIN HFA) 108 (90 Base) MCG/ACT inhaler Inhale 2 puffs into the lungs every 6 hours (Patient taking differently: Inhale 2 puffs into the lungs every 6 hours as needed ) 18 g 0     aspirin (ASA) 81 MG EC tablet Take 81 mg by mouth       lisinopril (ZESTRIL) 10 MG tablet TAKE 1 TABLET DAILY 90 tablet 2      No facility-administered medications prior to visit.     Allergies/Reactions: Cefuroxime, Clindamycin hcl, Codeine sulfate, Morphine sulfate, Rofecoxib, Tramadol, Levofloxacin, and Morphine    Orders Placed This Encounter   Procedures     PET Oncology (Eyes to Thighs)     Social History     Social History Narrative    , lives her , and has 2 grown children. Retired .     Social History     Tobacco Use     Smoking status: Former Smoker     Packs/day: 1.00     Years: 42.00     Pack years: 42.00     Types: Cigarettes     Start date:      Quit date: 2019     Years since quittin.5     Smokeless tobacco: Never Used     Tobacco comment: 1/2 or 1 pack per day    Substance Use Topics     Alcohol use: Not Currently     Comment: occassional     Drug use: No     Family History   Problem Relation Age of Onset     Lung Cancer Mother      Diabetes Mother      Pancreatic Cancer Mother 63     Lung Cancer Father 59     Abdominal Aortic Aneurysm Father      Diabetes Sister      Heart Failure Sister      Diabetes Brother      Diabetes Brother      Diabetes Maternal Grandfather      Hypertension No family hx of      Hyperlipidemia No family hx of      Asthma No family hx of      Thyroid Disease No family hx of    No other cancers in first or second degree relatives.    Review of Systems (supplemental to the ROS documented in the EMR and the History)   ROS (score of 0 indicates that symptom is at baseline for most sections below): See Flowsheet for additional comments as indicated.  No Pain (0)  Review of Systems   Constitutional: Positive for fatigue (mild fatigue, no nap). Negative for appetite change, diaphoresis, fever and unexpected weight change.   HENT: Negative for ear pain, nosebleeds, sore throat and voice change.    Eyes: Negative for visual disturbance.   Respiratory: Negative for cough, chest tightness and shortness of breath.    Cardiovascular: Negative for chest pain.  "  Gastrointestinal: Negative for abdominal distention, constipation, diarrhea, nausea, rectal pain and vomiting.        2-3 bowel movements per day (throughout day)   Endocrine: Positive for cold intolerance (joint pains attributed to arthritis).   Genitourinary: Positive for frequency (nocturia x4-5). Negative for difficulty urinating and hematuria.   Musculoskeletal: Positive for back pain (chronic). Negative for neck pain and neck stiffness.   Skin: Negative for color change, pallor and rash.   Neurological: Negative for tremors, seizures, speech difficulty, weakness and headaches.   Hematological: Negative for adenopathy. Bruises/bleeds easily (will see primary care service (as of 10/22/2021)).   Psychiatric/Behavioral: Negative for agitation, behavioral problems, confusion and sleep disturbance. The patient is not nervous/anxious.         Lifelong claustrophobia (avoids elevator after incident) (Valium for PET-CT)                                    Physical Exam   KPS: 90 (normal activity, minor signs/symptoms of disease).  ECOG Status: 0 (Fully active, able to carry on all activities without restriction)  Vitals: Ht 1.676 m (5' 6\")   Wt 79.3 kg (174 lb 12.8 oz)   BMI 28.21 kg/m    Wt Readings from Last 3 Encounters:   10/22/21 79.3 kg (174 lb 12.8 oz)   05/26/21 78.9 kg (174 lb)   04/27/21 80.7 kg (178 lb)     Clinical Examination:  General: Appears clinically/medically stable. Appears in good general health. Overweight (BMI 25-29). Appears mildly fatigued. Appears stated age. Appears well-developed, well-nourished, and in no acute distress. Does not appear acutely or chronically ill. Appears well groomed.  Head: No alopecia. Normocephalic.  Eyes: No glasses. Anicteric, vision intact.  ENT: Good volume. No hoarseness.  Neck: Soft, supple, symmetric, trachea midline.  Lymphatics: No cervical, supraclavicular, or axillary adenopathy.  Chest/Breasts: No port. No implanted cardiac device.  Lungs: Easy " respirations, no use of accessory muscles. Clear to auscultation  Cardiovascular: No jugulovenous distension. No carotid pulsations. RRR, S1, S2.  Abdomen: Nondistended, nontender.  Pelvis: Nondistended, nontender.  MSK: Good muscle tone. Good posture.  Integument: Recent atraumatic ecchymoses of dorsum of hands. No jaundice or pallor..  Neurologic: Right-handed. Alert, oriented, fluent. Memory intact. Motor intact. Sensory intact. No ataxia.  Psychologic: Well-spoken about her cancer and therapy. Good dynamic with . Clear, consistent thoughts and opinions. Slightly flat affect. Relaxed and motivated. Pleasant, cooperative, insightful, and concrete.    Physician Time on Day of Encounter, and MDM Data Reviewed and Analyzed on Day of Encounter   Time spent on patient activities is based on Chart review 82 minutes, Visit 55 minutes, and Documentation 96 minutes. Total time: 236 minutes.    MDM based on:       High risk element:  o Gastric MALT lymphoma. Claustrophobia requiring pharmacologic intervention. Pending radiation therapy to the abdomen for lymphoma.       Tests, documents, or independent historian(s) analyses include but are not limited to:  o Those referenced above in the bulleted HPI.       Independent Interpretations of tests include but are not limited to:  o As above.  o Ordered PET-CT.  o Ordered CT-based simulation.  o Referred patient back to her primary care service for atraumatic ecchymoses.    Physician Information Review   I reviewed the case with the patient and her , evaluated the patient, and discussed her treatment options and risks of therapy. I reviewed the medical records, radiographic information, and pathologic studies. I reviewed the imaging studies via PACS and with the patient and her . I reviewed our intake sheets. The patient and her  are good historians, began the visit with good insights into the disease process, and acknowledged the potentially poor  consequences of her disease. They educated themselves through a variety of educational media. They demonstrated good comprehension of our discussion and explored the treatment options with good understanding. They asked pertinent questions and insightful follow-up questions. I illustrated the anatomy and field of treatment. We discussed the onsite and telemedicine coverage of our clinic. I offered to speak with other family members and friends today by speakerphone. The patient declined my offer but granted me permission to speak with them if they contact me or come to clinic. The patient declined referral for an additional opinion or conservative care. She accepted referral to our social work staff for additional resources including potential counseling. The patient granted me permission to exchange medical information and records with healthcare professionals. No therapeutic radiation protocols for the patient's disease are available at our Center. We reviewed educational materials and provided the patient with written materials.    We discussed the patient's diagnosis of an H. pylori-negative gastric MALT lymphoma, regional anatomy, stage designation, and risk-group. We reviewed patterns of failure after different treatments and good prognosis with single modality treatment. We discussed factors specific to her disease including those noted above, personal circumstances, comorbidities, and other factors impacting the risk of toxicity and clinical outcomes. We reviewed the concept of multimodality care and the relative contribution of each to the paradigm of treating gastric MALT lymphoma that has progressed after an attenuated course of antibiotic therapies. We discussed the use of radiation therapy as the principal therapeutic modality and omission of surgery, immunotherapy, or chemotherapy for upfront therapy. We discussed the patient's desire to proceed with radiation therapy as potentially curative treatment  over observation with the hope of spontaneous regression. We discussed limitations of radiographic imaging in identifying the extent of disease. We discussed the potentially beneficial role of radiation therapy in the context of evolving standards and national guidelines of oncologic care such as the NCCN, ACR, and CHERELLE as well as management during the COVID-19 pandemic.    We discussed the nature of external beam radiation therapy from a [employer] TrueBeam linac, concept of CT-based simulation, role of computerized treatment planning, and potential interventions to reduce the toxicity of radiation while improving the efficacy of treatment for gastric MALT lymphoma. We would likely use image-guidance of either 3D or VMAT beam arrangements. Together, these techniques permit good coverage of complex target tissues (gastric region) while maintaining adequate sparing of normal critical structures (kidneys, duodenum, small bowel, large bowel, spinal cord,, etc.). We would use 4D-CT simulation for incorporation of respiratory motion control to assess the impact of pulmonary and cardiac movement on radiation therapy. I would not use stereotactic radiation therapy, protons, TomoTherapy, brachytherapy, or radioprotectant agents. I do not feel that these methods offer a clear benefit for this patient.    We discussed the relative risks, benefits, rationale, potential side effects, alternatives, and adjuncts to radiation therapy for gastric MALT lymphoma. Toxicities can be acute or chronic, and can negatively impact long-term quality of life. They can require high levels of supportive care and breaks in radiation therapy. I anticipate at least a moderate degree of nausea and vomiting even with Zofran as prophylaxis. Among the factors will increase the risk of toxicity are the co-morbidities and circumstances notes above. The toxicities include but are not limited to the gastrointestinal tract (nausea, vomiting, cramping,  diarrhea, mucositis, impaired nutrition, perforation, fistula, etc.), lungs (dyspnea, oxygen dependence, cough, pneumonitis, pleuritis, pleural effusion, etc.), heart (infarction, pericarditis, pericardial effusion, etc.), esophagus (pain, odynophagia, dysmotility, perforation, fistula, etc.),   lymphatic system (local and regional lymphedema), hematopoietic system (anemia, thrombocytopenia, neutropenia, etc.), kidney (renal dysfunction, anemia, hematuria, etc.),   adrenal dysfunction (endocrine compromise), spinal cord (myelitis, weakness, paralysis, sensory loss, pain, bowel or bladder incontinence, etc.), pain, infection, and other organs as well as systemic toxicities (e.g., fatigue) and long-term risks such as second malignancy.     Physician Radiation Therapy Assessment    In summary, I concur with the recommendation of radiation therapy as definitive treatment for gastric MALT lymphoma. Although asymptomatic, the disease has radiographically progressed through antibiotic therapy (the patient did not tolerate multiple antibiotic regimens, and the disease has become H. pylori negative). The patient strongly desires radiation therapy over observation for possible spontaneous regression. The staff, we therefore obtained written consent. The patient and her  wish to proceed as noted below. We answered all questions to their satisfaction. Thank you for allowing us to participate in the care of this very pleasant patient.    Physician Radiation Therapy Plan   1) Staging: Ordered PET-CT for restaging since the prior study is almost 7 months old. Tentatively scheduled for 11/3/2021. Valium prescription submitted per patient request and prior tolerance of the study.  2) Radiation therapy simulation: CT-based simulation tentatively scheduled for 11/8/2021. Patient requested to be NPO for least 3 hours prior to simulation and to urinate prior to the procedure.  3) Radiation therapy treatment: Anticipate treating  "the patient with \"involved field\" radiation therapy using a fractionation regimen (150 cGy) that is conventional for this disease. Anticipate treatment with either 3-dimensional conformal radiation therapy or volumetric-modulated arc therapy (VMAT) to minimize dose to the nearby kidneys as well as improved tolerance by minimizing dose to the duodenum and nearby small bowel. Zofran has been prescribed as prophylaxis against treatment-related nausea. To improve set-up reproducibility, patient requested to be NPO. for least 3 hours prior to simulation and to urinate prior to the procedure.  4) Atraumatic ecchymoses of the hands: Patient concerned about the recent development. Laboratory values, medications, and history do not identify a clear etiology. Patient's diagnosis of a gastric MALT lymphoma would not seem to predispose the patient to atraumatic ecchymoses. Patient intends on electively seeing her primary care service for guidance.  5) COVID-19: Discussed the potential impact of the pandemic on her treatment and our ability to deliver therapy. Discussed current strategies and approaches currently being used in our Department to treat positive, negative, and suspected patients regarding COVID-19.      Fritz York M.D., Ph.D.  Department of Radiation Oncology  Tel: [phone]  Fax: [phone]    Care Team:  Ian Brambila III, M.D. (medical oncology)  ARACELY MurrietaB.S. (gastroenterology) (fax: [phone])  Bela Sandhu N.P. (medicine)  "

## 2021-10-28 ENCOUNTER — TELEPHONE (OUTPATIENT)
Dept: RADIATION ONCOLOGY | Facility: HOSPITAL | Age: 71
End: 2021-10-28

## 2021-10-28 DIAGNOSIS — C88.40 EXTRANODAL MARGINAL ZONE LYMPHOMA OF MUCOSA-ASSOCIATED LYMPHOID TISSUE (MALT) OF STOMACH: Primary | ICD-10-CM

## 2021-10-28 NOTE — TELEPHONE ENCOUNTER
Clinic Care Coordination Contact  Care Team Conversations    Returned patients call regarding requesting a referral for [facility] in [place], [place]. She is moving to [facility] sooner than expected. She would like to cancel upcoming appointment for simulation as well.  Called to confirm that both Radiation Oncology and Medical oncology were offered services. It is confirmed that they are. Referrals can be sent to [phone].

## 2024-10-02 NOTE — H&P
Not applicable [facility]    History and Physical  Hospitalist       Date of Admission:  2/16/2021  Date of Service (when I saw the patient): 02/16/21    Assessment & Plan   Ivonne Kirkland is a 70 year old female who presents with hyponatremia.    Hyponatremia: Asymptomatic.  Sodium is 121.  Patient does have a history of chronic hyponatremia with sodium between 128 -130.  She recently was changed from hydrochlorothiazide to lisinopril for blood pressure medications.  She admits to drinking 5-6 bottles of water daily.  She also admits to actively avoiding salt.  -Free water restriction  -Regular diet  -Recheck sodium level in the a.m.    Essential hypertension: Her blood pressure is elevated at 170s systolic.  She does admit to having quite a bit of anxiety.  She was recently switched from hydrochlorothiazide to lisinopril 10 mg daily.  -Continue home lisinopril  -As needed vasodilators  -Can consider adding low-dose beta-blocker if her pressures are persistently high    COPD: No evidence of acute exacerbation at this time.  -Continue Advair and albuterol    FEN: Free water restriction.  Oral diet as tolerated.    DVT Prophylaxis: Low Risk/Ambulatory with no VTE prophylaxis indicated    Code Status: Full Code    Disposition: Expected discharge once sodium level improves.    Mariluz Miller MD    Primary Care Physician   Bela Sandhu      Chief Complaint   Hypertension    History is obtained from the patient    History of Present Illness   Ivonne Kirkland is a 70 year old female who presents with anxiety about her blood pressure.  Patient does have hypertension, and her blood pressure is elevated at 170 systolic.  She was recently changed from hydrochlorothiazide to lisinopril by her primary care physician due to side effects from the hydrochlorothiazide.  She was actually changed on 2/2/2021.  She was actually seen here in the emergency department on 2/9/2021 for the same worries.  At that time her blood pressure  was 200 systolic.  At that time, she was told to actively avoid salt.  Routine work-up revealed that her sodium had dropped to 121.  She is completely asymptomatic at this time.  On questioning, she reveals that she does drink 5-6 bottles of water a day.  In conjunction with her actively avoiding salt, can likely explain her hyponatremia.  With sodium level 121, she was admitted for observation.      Past Medical History    I have reviewed this patient's medical history and updated it with pertinent information if needed.   Past Medical History:   Diagnosis Date     COPD (chronic obstructive pulmonary disease) (H)     4-       Past Surgical History   I have reviewed this patient's surgical history and updated it with pertinent information if needed.  Past Surgical History:   Procedure Laterality Date     APPENDECTOMY       CHOLECYSTECTOMY       HYSTERECTOMY TOTAL ABDOMINAL      Approach unknown- abd vs vag?     ORTHOPEDIC SURGERY      right wrist fracture     ORTHOPEDIC SURGERY      left elbow cyst     SOFT TISSUE SURGERY      I and D cysts on back and chest       Prior to Admission Medications   Prior to Admission Medications   Prescriptions Last Dose Informant Patient Reported? Taking?   ADVAIR DISKUS 250-50 MCG/DOSE inhaler  Self Yes No   Sig: Inhale 1 puff into the lungs 2 times daily   ASPIRIN PO  Self Yes No   Sig: Take 81 mg by mouth daily.   Diclofenac Sodium (VOLTAREN EX)   Yes No   FISH OIL-KRILL OIL PO  Self Yes No   Homeopathic Products (THERAWORX RELIEF) FOAM   Yes No   albuterol (PROAIR HFA/PROVENTIL HFA/VENTOLIN HFA) 108 (90 Base) MCG/ACT inhaler  Self No No   Sig: Inhale 2 puffs into the lungs every 6 hours   calcium carbonate 600 mg-vitamin D 400 units (CALTRATE) 600-400 MG-UNIT per tablet 2/16/2021 at Unknown time  Yes Yes   Sig: Take 1 tablet by mouth daily   lisinopril (ZESTRIL) 10 MG tablet 2/16/2021 at Unknown time  No Yes   Sig: Take 1 tablet (10 mg) by mouth daily   predniSONE  (DELTASONE) 20 MG tablet More than a month at Unknown time Self No No   Sig: Take two tablets (= 40mg) each day for 5 (five) days   Patient not taking: Reported on 11/13/2020      Facility-Administered Medications: None     Allergies   Allergies   Allergen Reactions     Cefuroxime Other (See Comments) and Nausea     dizziness     Clindamycin Hcl Diarrhea     Codeine Sulfate Other (See Comments)     Passed out     Morphine Sulfate Nausea and Vomiting     Rofecoxib      Dizzy, weak feeling, and sick feeling.      Tramadol Other (See Comments) and Nausea       Social History   I have reviewed this patient's social history and updated it with pertinent information if needed. Ivonne Kirkland  reports that she quit smoking about 22 months ago. Her smoking use included cigarettes. She started smoking about 56 years ago. She has a 42.00 pack-year smoking history. She has never used smokeless tobacco. She reports current alcohol use. She reports that she does not use drugs.    Family History   I have reviewed this patient's family history and updated it with pertinent information if needed.   Family History   Problem Relation Age of Onset     Diabetes Mother      Pancreatic Cancer Mother 63     Diabetes Maternal Grandfather      Lung Cancer Father 59     Abdominal Aortic Aneurysm Father      Diabetes Sister      Heart Failure Sister      Diabetes Brother      Diabetes Brother      Hypertension No family hx of      Hyperlipidemia No family hx of      Asthma No family hx of      Thyroid Disease No family hx of        Review of Systems   The 10 point Review of Systems is negative other than noted in the HPI or here.     Physical Exam   Temp: 97.4  F (36.3  C) Temp src: Tympanic BP: 179/94 Pulse: 71   Resp: 18 SpO2: 99 % O2 Device: None (Room air)    Vital Signs with Ranges  Temp:  [97.4  F (36.3  C)] 97.4  F (36.3  C)  Pulse:  [71-80] 71  Resp:  [16-20] 18  BP: (160-179)/() 179/94  SpO2:  [99 %] 99 %  0 lbs 0  oz    Constitutional: AA, NAD  Eyes: PERRLA, no injection, no icterus  HEENT: atraumatic, normocephalic  Respiratory: CTA b/l  Cardiovascular: S1 S2 RRR  GI: soft, NT, ND, + bowel sounds  Lymph/Hematologic: no palpable lymphadenopathy  Skin: no rashes, no lesions  Musculoskeletal: No edema, good tone, no deformities  Neurologic: oriented x 3, no focal deficits  Psychiatric: appropriate affect    Data   Data reviewed today:  I personally reviewed imaging reports.  Recent Labs   Lab 02/16/21  1504 02/11/21  1249 02/09/21  1837   WBC 6.7  --  5.4   HGB 12.7  --  13.3   MCV 89  --  89     --  208   * 126* 125*   POTASSIUM 4.1 4.1 3.4   CHLORIDE 91* 93* 91*   CO2 24 29 28   BUN 13 16 15   CR 0.71 0.79 0.71   ANIONGAP 6 4 6   TEN 8.1* 8.5 8.4*   GLC 99 91 110*   TROPI <0.015  --   --           No results found for this or any previous visit (from the past 24 hour(s)).